# Patient Record
Sex: MALE | Race: WHITE | NOT HISPANIC OR LATINO | Employment: FULL TIME | RURAL
[De-identification: names, ages, dates, MRNs, and addresses within clinical notes are randomized per-mention and may not be internally consistent; named-entity substitution may affect disease eponyms.]

---
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
## 2021-06-04 ENCOUNTER — IMMUNIZATION (OUTPATIENT)
Dept: FAMILY MEDICINE | Facility: CLINIC | Age: 21
End: 2021-06-04
Payer: OTHER GOVERNMENT

## 2021-06-04 DIAGNOSIS — Z23 NEED FOR VACCINATION: Primary | ICD-10-CM

## 2021-06-04 PROCEDURE — 91301 COVID-19, MRNA, LNP-S, PF, 100 MCG/0.5 ML DOSE VACCINE: ICD-10-PCS | Mod: ,,, | Performed by: FAMILY MEDICINE

## 2021-06-04 PROCEDURE — 0011A COVID-19, MRNA, LNP-S, PF, 100 MCG/0.5 ML DOSE VACCINE: ICD-10-PCS | Mod: ,,, | Performed by: FAMILY MEDICINE

## 2021-06-04 PROCEDURE — 91301 COVID-19, MRNA, LNP-S, PF, 100 MCG/0.5 ML DOSE VACCINE: CPT | Mod: ,,, | Performed by: FAMILY MEDICINE

## 2021-06-04 PROCEDURE — 0011A COVID-19, MRNA, LNP-S, PF, 100 MCG/0.5 ML DOSE VACCINE: CPT | Mod: ,,, | Performed by: FAMILY MEDICINE

## 2021-07-16 ENCOUNTER — IMMUNIZATION (OUTPATIENT)
Dept: FAMILY MEDICINE | Facility: CLINIC | Age: 21
End: 2021-07-16
Payer: OTHER GOVERNMENT

## 2021-07-16 DIAGNOSIS — Z23 NEED FOR VACCINATION: Primary | ICD-10-CM

## 2021-07-16 PROCEDURE — 91301 COVID-19, MRNA, LNP-S, PF, 100 MCG/0.5 ML DOSE VACCINE: CPT | Mod: ,,, | Performed by: FAMILY MEDICINE

## 2021-07-16 PROCEDURE — 91301 COVID-19, MRNA, LNP-S, PF, 100 MCG/0.5 ML DOSE VACCINE: ICD-10-PCS | Mod: ,,, | Performed by: FAMILY MEDICINE

## 2021-07-16 PROCEDURE — 0012A COVID-19, MRNA, LNP-S, PF, 100 MCG/0.5 ML DOSE VACCINE: CPT | Mod: ,,, | Performed by: FAMILY MEDICINE

## 2021-07-16 PROCEDURE — 0012A COVID-19, MRNA, LNP-S, PF, 100 MCG/0.5 ML DOSE VACCINE: ICD-10-PCS | Mod: ,,, | Performed by: FAMILY MEDICINE

## 2022-07-26 ENCOUNTER — OFFICE VISIT (OUTPATIENT)
Dept: FAMILY MEDICINE | Facility: CLINIC | Age: 22
End: 2022-07-26
Payer: OTHER GOVERNMENT

## 2022-07-26 VITALS
HEART RATE: 64 BPM | OXYGEN SATURATION: 98 % | TEMPERATURE: 99 F | DIASTOLIC BLOOD PRESSURE: 84 MMHG | RESPIRATION RATE: 16 BRPM | HEIGHT: 69 IN | BODY MASS INDEX: 29.59 KG/M2 | WEIGHT: 199.81 LBS | SYSTOLIC BLOOD PRESSURE: 122 MMHG

## 2022-07-26 DIAGNOSIS — E66.3 OVERWEIGHT (BMI 25.0-29.9): Primary | ICD-10-CM

## 2022-07-26 DIAGNOSIS — Z79.899 HIGH RISK MEDICATION USE: ICD-10-CM

## 2022-07-26 DIAGNOSIS — E66.9 OBESITY, UNSPECIFIED CLASSIFICATION, UNSPECIFIED OBESITY TYPE, UNSPECIFIED WHETHER SERIOUS COMORBIDITY PRESENT: ICD-10-CM

## 2022-07-26 LAB

## 2022-07-26 PROCEDURE — 80305 DRUG TEST PRSMV DIR OPT OBS: CPT | Mod: QW,,, | Performed by: NURSE PRACTITIONER

## 2022-07-26 PROCEDURE — 99214 OFFICE O/P EST MOD 30 MIN: CPT | Mod: ,,, | Performed by: NURSE PRACTITIONER

## 2022-07-26 PROCEDURE — 80305 POCT URINE DRUG SCREEN PRESUMP: ICD-10-PCS | Mod: QW,,, | Performed by: NURSE PRACTITIONER

## 2022-07-26 PROCEDURE — 99214 PR OFFICE/OUTPT VISIT, EST, LEVL IV, 30-39 MIN: ICD-10-PCS | Mod: ,,, | Performed by: NURSE PRACTITIONER

## 2022-07-26 RX ORDER — PHENTERMINE HYDROCHLORIDE 37.5 MG/1
37.5 TABLET ORAL
Qty: 30 TABLET | Refills: 0 | Status: SHIPPED | OUTPATIENT
Start: 2022-07-26 | End: 2022-08-25

## 2022-07-26 NOTE — PROGRESS NOTES
Donna Leyva NP   [facility]  [address]  [place]     PATIENT NAME: Antonio Mejia  : 2000  DATE: 22  MRN: [id number]      Billing Provider: Donna Leyva NP  Level of Service:   Patient PCP Information     Provider PCP Type    Donna Leyva NP General          Reason for Visit / Chief Complaint: ADHD (Has been on Adderall before. However, wants to discuss rx for Adipex r/t appetite suppressant. )       Update PCP  Update Chief Complaint         History of Present Illness / Problem Focused Workflow     Antonio Mejia presents to the clinic pt does not want to take any adderall due to being in the , however, he stated that he needs to use a few pounds and would like to try some adipex. Has been exercising and dieting, and lost wt but feels he has platoed  I discussed with him the adverse effects of adipex such increased risks for strokes, MIs and malfunction of heart valves. He stated that he was aware of it and wanted to try for just 1 month    Review of Systems     Review of Systems   Constitutional: Negative for chills, fatigue and fever.   HENT: Negative for nasal congestion, ear pain, facial swelling, hearing loss, mouth dryness, mouth sores, postnasal drip, rhinorrhea, sinus pressure/congestion and goiter.    Eyes: Negative for discharge and itching.   Respiratory: Negative for cough, shortness of breath and wheezing.    Cardiovascular: Negative for chest pain and leg swelling.   Gastrointestinal: Negative for abdominal pain, change in bowel habit and change in bowel habit.   Genitourinary: Negative for difficulty urinating, dysuria, enuresis, frequency, hematuria and urgency.   Neurological: Negative for dizziness, vertigo, syncope, weakness and headaches.   Psychiatric/Behavioral: Negative for decreased concentration.        Medical / Social / Family History     Past Medical History:   Diagnosis Date    ADHD (attention deficit hyperactivity disorder)   "      History reviewed. No pertinent surgical history.    Social History    reports that he has quit smoking. He has never used smokeless tobacco. He reports previous alcohol use.    Family History  's family history includes Dementia in his paternal grandmother.    Medications and Allergies     Medications  No outpatient medications have been marked as taking for the 7/26/22 encounter (Office Visit) with Donna Leyva NP.       Allergies  Review of patient's allergies indicates:  No Known Allergies    Physical Examination     Vitals:    07/26/22 1418   BP: 122/84   BP Location: Right arm   Patient Position: Sitting   Pulse: 64   Resp: 16   Temp: 98.7 °F (37.1 °C)   TempSrc: Oral   SpO2: 98%   Weight: 90.6 kg (199 lb 12.8 oz)   Height: 5' 9" (1.753 m)      Physical Exam  Vitals and nursing note reviewed.   Constitutional:       Appearance: Normal appearance.      Comments: pts bmi is 9.51, just borderline for obesity   HENT:      Head: Normocephalic.      Right Ear: Tympanic membrane, ear canal and external ear normal.      Left Ear: Tympanic membrane, ear canal and external ear normal.      Nose: Nose normal.      Mouth/Throat:      Mouth: Mucous membranes are moist.      Pharynx: Oropharynx is clear.   Eyes:      Extraocular Movements: Extraocular movements intact.      Conjunctiva/sclera: Conjunctivae normal.      Pupils: Pupils are equal, round, and reactive to light.   Cardiovascular:      Rate and Rhythm: Normal rate and regular rhythm.      Pulses: Normal pulses.      Heart sounds: Normal heart sounds.   Pulmonary:      Effort: Pulmonary effort is normal.      Breath sounds: Normal breath sounds.   Abdominal:      General: Bowel sounds are normal.      Palpations: Abdomen is soft.   Musculoskeletal:         General: Normal range of motion.   Skin:     General: Skin is warm and dry.      Capillary Refill: Capillary refill takes less than 2 seconds.   Neurological:      General: No focal deficit " present.      Mental Status: He is alert and oriented to person, place, and time.   Psychiatric:         Mood and Affect: Mood normal.         Behavior: Behavior normal.          Assessment and Plan (including Health Maintenance)      Problem List  Smart Sets  Document Outside HM   :    Plan: cont to exercise, low carb diet, meds as ordered, return to clinic as needed. Stop taking meds any chest pain or sob   and uds reviewed, both negative    Attention deficit hyperactivity disorder (ADHD), unspecified ADHD type  -     POCT Urine Drug Screen Presump            Health Maintenance Due   Topic Date Due    Hepatitis C Screening  Never done    Lipid Panel  Never done    HPV Vaccines (1 - Male 2-dose series) Never done    HIV Screening  Never done    TETANUS VACCINE  Never done    COVID-19 Vaccine (3 - Booster for Moderna series) 12/16/2021       Problem List Items Addressed This Visit    None     Visit Diagnoses     Attention deficit hyperactivity disorder (ADHD), unspecified ADHD type    -  Primary    Relevant Orders    POCT Urine Drug Screen Presump (Completed)            Health Maintenance Topics with due status: Not Due       Topic Last Completion Date    Influenza Vaccine Not Due       Procedures     No future appointments.     No follow-ups on file.       Signature:  Donna Leyva NP    Date of encounter: 7/26/22

## 2023-12-21 ENCOUNTER — ATHLETIC TRAINING SESSION (OUTPATIENT)
Dept: SPORTS MEDICINE | Facility: CLINIC | Age: 23
End: 2023-12-21

## 2023-12-22 NOTE — PROGRESS NOTES
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    HPI    ROS              Objective:       General: Antonio is well-developed, well-nourished, appears stated age, in no acute distress, alert and oriented to time, place and person.     AT Session  Has been to a doctor in [place] and wants to see an orthopedic      Assessment:     Status:     Date Seen:     Date of Injury:     Date Out:     Date Cleared:       Plan:       1. Ice, rest when he can, ibuprofen for pain  2. Physician Referral: yes, Dr. Walter Ventura  3. ED Referral: no  4. Parent/Guardian Notified: No  5. All questions were answered, ath. will contact me for questions or concerns in  the interim.  6.         Eligible to use School Insurance:

## 2024-01-31 DIAGNOSIS — M25.561 RIGHT KNEE PAIN, UNSPECIFIED CHRONICITY: Primary | ICD-10-CM

## 2024-02-05 ENCOUNTER — OFFICE VISIT (OUTPATIENT)
Dept: FAMILY MEDICINE | Facility: CLINIC | Age: 24
End: 2024-02-05
Payer: OTHER GOVERNMENT

## 2024-02-05 VITALS
RESPIRATION RATE: 19 BRPM | TEMPERATURE: 99 F | BODY MASS INDEX: 32.16 KG/M2 | DIASTOLIC BLOOD PRESSURE: 85 MMHG | SYSTOLIC BLOOD PRESSURE: 128 MMHG | OXYGEN SATURATION: 98 % | HEART RATE: 80 BPM | WEIGHT: 217.81 LBS

## 2024-02-05 DIAGNOSIS — J01.00 ACUTE NON-RECURRENT MAXILLARY SINUSITIS: Primary | ICD-10-CM

## 2024-02-05 PROCEDURE — 99213 OFFICE O/P EST LOW 20 MIN: CPT | Mod: ,,, | Performed by: NURSE PRACTITIONER

## 2024-02-05 RX ORDER — FLUTICASONE PROPIONATE 50 MCG
2 SPRAY, SUSPENSION (ML) NASAL DAILY
Qty: 16 G | Refills: 0 | Status: SHIPPED | OUTPATIENT
Start: 2024-02-05

## 2024-02-05 RX ORDER — AZITHROMYCIN 250 MG/1
TABLET, FILM COATED ORAL
Qty: 6 TABLET | Refills: 0 | Status: SHIPPED | OUTPATIENT
Start: 2024-02-05 | End: 2024-02-10

## 2024-02-05 NOTE — ASSESSMENT & PLAN NOTE
Zpak prescribed to take as directed. Instructed to take all abx until gone even if start to feel better.    Flonase prescribed to take as needed for nasal congestion.   Declines injection today.   Pt states he is just going to give his wife his med when he starts to feeling better. Informed he really needs to take his full rx of abx but I will also send in zpak for wife as well as she has had a recent visit at this clinic. Pt vu.   Instructed to RTC for any new/worsening/persisting ssx.

## 2024-02-05 NOTE — PROGRESS NOTES
MARLON Maria   [facility]  [address]  [place] [address]     PATIENT NAME: Antonio Mejia  : 2000  DATE: 24  MRN: [id number]      Billing Provider: MARLON Maria  Level of Service:   Patient PCP Information       Provider PCP Type    MARLON Maria General            Reason for Visit / Chief Complaint: Cough (Pt is c/o of a sore throat,cough and sinus pressure x 3 days. States his wife also has the same symptoms as well. Swabs denied.) and Sore Throat   Health Maintenance Due   Topic Date Due    Hepatitis C Screening  Never done    Lipid Panel  Never done    HPV Vaccines (1 - Male 2-dose series) Never done    HIV Screening  Never done    TETANUS VACCINE  Never done    COVID-19 Vaccine (3 - 2023-24 season) 2023          History of Present Illness / Problem Focused Workflow     Antonio Mejia presents to the clinic with headache, cough, sore throat, nasal congestion, sinus pressure. He denies swabs today. He states cough is productive with green sputum. He states wife is also having same symptoms and would like something called in for her as well. He denies fever, earaches, abd pain, n/v/d, He does not have a significant PMH and does not take any routine medications. He denies any other need at this time. NAD noted.       Wt Readings from Last 3 Encounters:   24 1450 98.8 kg (217 lb 12.8 oz)   22 1418 90.6 kg (199 lb 12.8 oz)        BP Readings from Last 3 Encounters:   24 128/85   22 122/84        Review of Systems     Review of Systems   Constitutional: Negative.    HENT:  Positive for nasal congestion, postnasal drip, sinus pressure/congestion and sore throat. Negative for ear discharge and ear pain.    Eyes: Negative.    Respiratory:  Positive for cough.    Cardiovascular: Negative.    Gastrointestinal: Negative.    Endocrine: Negative.    Genitourinary: Negative.    Musculoskeletal: Negative.    Integumentary:  Negative.    Allergic/Immunologic: Negative.    Neurological:  Positive for headaches.   Hematological: Negative.    Psychiatric/Behavioral: Negative.          Medical / Social / Family History     Past Medical History:   Diagnosis Date    ADHD (attention deficit hyperactivity disorder)        History reviewed. No pertinent surgical history.    Social History    reports that he has quit smoking. He has never used smokeless tobacco. He reports that he does not currently use alcohol.    Family History  's family history includes Dementia in his paternal grandmother.    Medications and Allergies     Medications  No outpatient medications have been marked as taking for the 2/5/24 encounter (Office Visit) with Allyson Jesus FNP.       Allergies  Review of patient's allergies indicates:  No Known Allergies    Physical Examination     Vitals:    02/05/24 1450   BP: 128/85   BP Location: Left arm   Patient Position: Sitting   BP Method: Large (Automatic)   Pulse: 80   Resp: 19   Temp: 99.4 °F (37.4 °C)   TempSrc: Oral   SpO2: 98%   Weight: 98.8 kg (217 lb 12.8 oz)      Physical Exam  Constitutional:       Appearance: Normal appearance.   HENT:      Head: Normocephalic.      Right Ear: Hearing, ear canal and external ear normal. A middle ear effusion is present.      Left Ear: Hearing, ear canal and external ear normal. A middle ear effusion is present.      Nose: Congestion present.      Mouth/Throat:      Mouth: Mucous membranes are moist.      Comments: PND noted  Eyes:      Extraocular Movements: Extraocular movements intact.   Cardiovascular:      Rate and Rhythm: Normal rate and regular rhythm.      Pulses: Normal pulses.      Heart sounds: Normal heart sounds.   Pulmonary:      Effort: Pulmonary effort is normal.      Breath sounds: Normal breath sounds.   Skin:     General: Skin is warm and dry.      Capillary Refill: Capillary refill takes less than 2 seconds.   Neurological:      General: No focal deficit present.       Mental Status: He is alert and oriented to person, place, and time.   Psychiatric:         Mood and Affect: Mood normal.         Behavior: Behavior normal.          Assessment and Plan (including Health Maintenance)   :    Plan:     There are no Patient Instructions on file for this visit.       Health Maintenance Due   Topic Date Due    Hepatitis C Screening  Never done    Lipid Panel  Never done    HPV Vaccines (1 - Male 2-dose series) Never done    HIV Screening  Never done    TETANUS VACCINE  Never done    COVID-19 Vaccine (3 - 2023-24 season) 09/01/2023       Problem List Items Addressed This Visit       Acute non-recurrent maxillary sinusitis - Primary    Current Assessment & Plan     Zpak prescribed to take as directed. Instructed to take all abx until gone even if start to feel better.    Flonase prescribed to take as needed for nasal congestion.   Declines injection today.   Pt states he is just going to give his wife his med when he starts to feeling better. Informed he really needs to take his full rx of abx but I will also send in zpak for wife as well as she has had a recent visit at this clinic. Pt vu.   Instructed to RTC for any new/worsening/persisting ssx.           Relevant Medications    fluticasone propionate (FLONASE) 50 mcg/actuation nasal spray    azithromycin (Z-PETRA) 250 MG tablet     Acute non-recurrent maxillary sinusitis  -     fluticasone propionate (FLONASE) 50 mcg/actuation nasal spray; 2 sprays (100 mcg total) by Each Nostril route once daily.  Dispense: 16 g; Refill: 0  -     azithromycin (Z-PETRA) 250 MG tablet; Take 2 tablets by mouth on day 1; Take 1 tablet by mouth on days 2-5  Dispense: 6 tablet; Refill: 0       The patient has no Health Maintenance topics of status Not Due      Future Appointments   Date Time Provider Department Center   2/6/2024 10:30 AM Walter Ventura MD [facility]   2/6/2024 10:30 AM [facility] XR[facility]        Follow up if  symptoms worsen or fail to improve.    Health Maintenance Due   Topic Date Due    Hepatitis C Screening  Never done    Lipid Panel  Never done    HPV Vaccines (1 - Male 2-dose series) Never done    HIV Screening  Never done    TETANUS VACCINE  Never done    COVID-19 Vaccine (3 - 2023-24 season) 09/01/2023        Signature:  MARLON Maria    Date of encounter: 2/5/24

## 2024-02-06 ENCOUNTER — HOSPITAL ENCOUNTER (OUTPATIENT)
Dept: RADIOLOGY | Facility: HOSPITAL | Age: 24
Discharge: HOME | End: 2024-02-06
Attending: ORTHOPAEDIC SURGERY
Payer: OTHER GOVERNMENT

## 2024-02-06 ENCOUNTER — OFFICE VISIT (OUTPATIENT)
Dept: ORTHOPEDICS | Facility: CLINIC | Age: 24
End: 2024-02-06
Payer: OTHER GOVERNMENT

## 2024-02-06 VITALS — BODY MASS INDEX: 31.1 KG/M2 | WEIGHT: 210 LBS | HEIGHT: 69 IN

## 2024-02-06 DIAGNOSIS — S83.211A BUCKET-HANDLE TEAR OF MEDIAL MENISCUS OF RIGHT KNEE AS CURRENT INJURY, INITIAL ENCOUNTER: Primary | ICD-10-CM

## 2024-02-06 DIAGNOSIS — M25.561 RIGHT KNEE PAIN, UNSPECIFIED CHRONICITY: ICD-10-CM

## 2024-02-06 PROCEDURE — 73564 X-RAY EXAM KNEE 4 OR MORE: CPT | Mod: TC,RT

## 2024-02-06 PROCEDURE — 99204 OFFICE O/P NEW MOD 45 MIN: CPT | Mod: S$PBB,,, | Performed by: ORTHOPAEDIC SURGERY

## 2024-02-06 PROCEDURE — 73564 X-RAY EXAM KNEE 4 OR MORE: CPT | Mod: 26,RT,, | Performed by: ORTHOPAEDIC SURGERY

## 2024-02-06 PROCEDURE — 99213 OFFICE O/P EST LOW 20 MIN: CPT | Mod: PBBFAC,25 | Performed by: ORTHOPAEDIC SURGERY

## 2024-02-06 NOTE — H&P (VIEW-ONLY)
ASSESSMENT:      ICD-10-CM ICD-9-CM   1. Bucket-handle tear of medial meniscus of right knee as current injury, initial encounter  S83.211A 836.0       PLAN:     Findings and treatment options were discussed with the patient regarding the diagnosis.   All questions were answered regarding Antonio Mejia 's painful knee.      The conservative options including NSAIDs, activity modification, physical therapy, corticosteroid injection, and viscosupplimentation/ Platelet rich plasma injections were discussed. he is interested in surgical intervention at this time, as conservative measures up to this point have not fully relieved or resolved his symptoms.    The surgical process of knee arthroscopy was discussed in detail with the patient including a detailed discussion of the procedure itself (including visual model, x-ray review, and literature review). The typical perioperative and post-operative course was discussed and perioperative risks were discussed to the patient's satisfaction.  Risks and complications discussed included but were not limited to the risks of anesthetic complications, infection, bleeding, wound healing complications, instability, limb length inequality, neurologic dysfunction including numbness,  DVT, pulmonary embolism, perioperative medical risks (cardiac, pulmonary, renal, neurologic), and death and the patient elects to proceed. We will initiate pre-operative medical evaluation and set a provisional date for surgical intervention according to the patient's schedule. 25 minutes was spent in direct consultation with the patient counselling him on the items listed above.    Will plan for Rt knee medial meniscus repair    There are no Patient Instructions on file for this visit.    IMAGING:   X-Ray Knee Complete 4 Or More Views Right    Result Date: 2/6/2024  See Procedure Notes for results. IMPRESSION: Please see Ortho procedure notes for report.  This procedure was auto-finalized  by: Virtual Radiologist   Four views right knee were obtained today demonstrating well-preserved mediolateral joint spaces no signs of fracture dislocation or pathologic bone    I did review a MRI which was obtained at an outside facility demonstrate the presence of a displaced flipped bucket-handle medial meniscus tear.      CC: Knee pain    24 y.o. Male who presents as a new patient to me for evaluation of  right knee pain.  States he has been having pain for several months. His knee gave way back in October while he was on duty.  Occupation:[employer]  Pain has been present for several months.  Injury description slipped on ice.   Mechanical symptoms, such as clicking, locking, and popping are present.    Swelling and effusions  are not present.   The patient does not  describe symptoms of knee instability, such as the knee buckling and the knee giving way.   Symptoms are worsened with activity.  Better with rest. Treatment thus far has included rest, activity modifications, and oral medications.    he  has not had formal physical therapy.  he has not had prior injections injections into the knee.   he has had previous advanced imaging such as MRI.     Here today to discuss diagnosis and treatment options.          REVIEW OF SYSTEMS:   Constitution: Negative. Negative for chills, fever and night sweats.    Hematologic/Lymphatic: Negative for bleeding problem. Does not bruise/bleed easily.   Skin: Negative for dry skin, itching and rash.   Musculoskeletal: Negative for falls. Positive for knee pain and muscle weakness.     All other review of symptoms were reviewed and found to be noncontributory.     PAST MEDICAL HISTORY:   Past Medical History:   Diagnosis Date    ADHD (attention deficit hyperactivity disorder)        PAST SURGICAL HISTORY:   Past Surgical History:   Procedure Laterality Date    HAND SURGERY      TONSILLECTOMY         FAMILY HISTORY:   Family History   Problem Relation Age of Onset    Dementia  Paternal Grandmother        SOCIAL HISTORY:   Social History     Socioeconomic History    Marital status:    Tobacco Use    Smoking status: Former    Smokeless tobacco: Never   Substance and Sexual Activity    Alcohol use: Not Currently       MEDICATIONS:     Current Outpatient Medications:     azithromycin (Z-PETRA) 250 MG tablet, Take 2 tablets by mouth on day 1; Take 1 tablet by mouth on days 2-5, Disp: 6 tablet, Rfl: 0    fluticasone propionate (FLONASE) 50 mcg/actuation nasal spray, 2 sprays (100 mcg total) by Each Nostril route once daily., Disp: 16 g, Rfl: 0    ALLERGIES:   Review of patient's allergies indicates:  No Known Allergies     PHYSICAL EXAMINATION:    General    Constitutional: He is oriented to person, place, and time. He appears well-developed and well-nourished.   HENT:   Head: Normocephalic and atraumatic.   Nose: Nose normal.   Eyes: EOM are normal. Pupils are equal, round, and reactive to light.   Cardiovascular:  Normal rate and intact distal pulses.            Pulmonary/Chest: Effort normal. No respiratory distress. He exhibits no tenderness.   Abdominal: Soft. He exhibits no distension. There is no abdominal tenderness.   Neurological: He is alert and oriented to person, place, and time. He has normal reflexes.   Psychiatric: He has a normal mood and affect. His behavior is normal. Judgment and thought content normal.           Right Knee Exam     Inspection   Swelling: present  Deformity: absent    Tenderness   The patient is tender to palpation of the medial retinaculum and medial joint line.    Crepitus   The patient has crepitus of the medial joint line and medial plica.    Range of Motion   Flexion:  abnormal     Tests   Meniscus   Varun:  Medial - positive   Ligament Examination   Lachman: normal (-1 to 2mm)   MCL - Valgus: normal (0 to 2mm)  LCL - Varus: normal  Dial Test at 30 degrees: normal (< 5 degrees)  Posterolateral Corner: unstable (>15 degrees  difference)  Patella   Patellar apprehension: negative  Patellar Grind: positive    Other   Sensation: normal    Comments:  Pain with Thessaly Maneuver    Left Knee Exam   Left knee exam is normal.    Muscle Strength   Right Lower Extremity   Quadriceps:  5/5   Hamstrin/5     Reflexes     Right Side   Achilles:  2+  Quadriceps:  2+    Vascular Exam     Right Pulses  Dorsalis Pedis:      2+  Posterior Tibial:      2+        No orders of the defined types were placed in this encounter.      Procedures

## 2024-02-09 DIAGNOSIS — S83.211A BUCKET-HANDLE TEAR OF MEDIAL MENISCUS OF RIGHT KNEE AS CURRENT INJURY, INITIAL ENCOUNTER: Primary | ICD-10-CM

## 2024-02-09 DIAGNOSIS — S83.211A: ICD-10-CM

## 2024-02-09 RX ORDER — MUPIROCIN 20 MG/G
OINTMENT TOPICAL
Status: CANCELLED | OUTPATIENT
Start: 2024-02-09

## 2024-02-09 RX ORDER — CEFAZOLIN SODIUM 2 G/50ML
2 SOLUTION INTRAVENOUS
Status: CANCELLED | OUTPATIENT
Start: 2024-02-09

## 2024-02-09 RX ORDER — SODIUM CHLORIDE 9 MG/ML
INJECTION, SOLUTION INTRAVENOUS CONTINUOUS
Status: CANCELLED | OUTPATIENT
Start: 2024-02-09

## 2024-02-13 ENCOUNTER — TELEPHONE (OUTPATIENT)
Dept: ORTHOPEDICS | Facility: CLINIC | Age: 24
End: 2024-02-13
Payer: OTHER GOVERNMENT

## 2024-02-16 ENCOUNTER — TELEPHONE (OUTPATIENT)
Dept: ORTHOPEDICS | Facility: CLINIC | Age: 24
End: 2024-02-16
Payer: OTHER GOVERNMENT

## 2024-02-19 ENCOUNTER — HOSPITAL ENCOUNTER (OUTPATIENT)
Facility: HOSPITAL | Age: 24
Discharge: HOME | End: 2024-02-19
Attending: ORTHOPAEDIC SURGERY | Admitting: ORTHOPAEDIC SURGERY
Payer: OTHER GOVERNMENT

## 2024-02-19 ENCOUNTER — ANESTHESIA EVENT (OUTPATIENT)
Dept: SURGERY | Facility: HOSPITAL | Age: 24
End: 2024-02-19
Payer: OTHER GOVERNMENT

## 2024-02-19 ENCOUNTER — ANESTHESIA (OUTPATIENT)
Dept: SURGERY | Facility: HOSPITAL | Age: 24
End: 2024-02-19
Payer: OTHER GOVERNMENT

## 2024-02-19 VITALS
BODY MASS INDEX: 30.06 KG/M2 | RESPIRATION RATE: 16 BRPM | HEIGHT: 70 IN | DIASTOLIC BLOOD PRESSURE: 52 MMHG | WEIGHT: 210 LBS | SYSTOLIC BLOOD PRESSURE: 130 MMHG | HEART RATE: 80 BPM | OXYGEN SATURATION: 98 % | TEMPERATURE: 98 F

## 2024-02-19 DIAGNOSIS — S83.211A BUCKET-HANDLE TEAR OF MEDIAL MENISCUS OF RIGHT KNEE AS CURRENT INJURY, INITIAL ENCOUNTER: ICD-10-CM

## 2024-02-19 DIAGNOSIS — S83.211A: Primary | ICD-10-CM

## 2024-02-19 PROCEDURE — 37000009 HC ANESTHESIA EA ADD 15 MINS: Performed by: ORTHOPAEDIC SURGERY

## 2024-02-19 PROCEDURE — 37000008 HC ANESTHESIA 1ST 15 MINUTES: Performed by: ORTHOPAEDIC SURGERY

## 2024-02-19 PROCEDURE — 36000711: Performed by: ORTHOPAEDIC SURGERY

## 2024-02-19 PROCEDURE — D9220A PRA ANESTHESIA: Mod: ANES,,, | Performed by: ANESTHESIOLOGY

## 2024-02-19 PROCEDURE — 63600175 PHARM REV CODE 636 W HCPCS

## 2024-02-19 PROCEDURE — 36000710: Performed by: ORTHOPAEDIC SURGERY

## 2024-02-19 PROCEDURE — 27201423 OPTIME MED/SURG SUP & DEVICES STERILE SUPPLY: Performed by: ORTHOPAEDIC SURGERY

## 2024-02-19 PROCEDURE — 25000003 PHARM REV CODE 250: Performed by: ANESTHESIOLOGY

## 2024-02-19 PROCEDURE — 25000003 PHARM REV CODE 250: Performed by: ORTHOPAEDIC SURGERY

## 2024-02-19 PROCEDURE — 71000015 HC POSTOP RECOV 1ST HR: Performed by: ORTHOPAEDIC SURGERY

## 2024-02-19 PROCEDURE — 01400 ANES OPN/ARTHRS KNEE JT NOS: CPT | Performed by: ORTHOPAEDIC SURGERY

## 2024-02-19 PROCEDURE — 29882 ARTHRS KNE SRG MNISC RPR M/L: CPT | Mod: 22,RT,, | Performed by: ORTHOPAEDIC SURGERY

## 2024-02-19 PROCEDURE — 63600175 PHARM REV CODE 636 W HCPCS: Performed by: ANESTHESIOLOGY

## 2024-02-19 PROCEDURE — 71000033 HC RECOVERY, INTIAL HOUR: Performed by: ORTHOPAEDIC SURGERY

## 2024-02-19 PROCEDURE — 25000003 PHARM REV CODE 250

## 2024-02-19 PROCEDURE — D9220A PRA ANESTHESIA: Mod: CRNA,,,

## 2024-02-19 PROCEDURE — C1713 ANCHOR/SCREW BN/BN,TIS/BN: HCPCS | Performed by: ORTHOPAEDIC SURGERY

## 2024-02-19 PROCEDURE — 97161 PT EVAL LOW COMPLEX 20 MIN: CPT

## 2024-02-19 PROCEDURE — 71000016 HC POSTOP RECOV ADDL HR: Performed by: ORTHOPAEDIC SURGERY

## 2024-02-19 PROCEDURE — 63600175 PHARM REV CODE 636 W HCPCS: Mod: JZ,JG | Performed by: ORTHOPAEDIC SURGERY

## 2024-02-19 DEVICE — FAST-FIX 360 CURVED MENISCAL                                    REPAIR SYSTEM
Type: IMPLANTABLE DEVICE | Site: KNEE | Status: FUNCTIONAL
Brand: FAST-FIX

## 2024-02-19 DEVICE — SYS FASTFIX REV CURVED NDL 360: Type: IMPLANTABLE DEVICE | Site: KNEE | Status: FUNCTIONAL

## 2024-02-19 RX ORDER — ONDANSETRON 4 MG/1
8 TABLET, ORALLY DISINTEGRATING ORAL EVERY 8 HOURS PRN
Status: DISCONTINUED | OUTPATIENT
Start: 2024-02-19 | End: 2024-02-19 | Stop reason: HOSPADM

## 2024-02-19 RX ORDER — PROPOFOL 10 MG/ML
VIAL (ML) INTRAVENOUS
Status: DISCONTINUED | OUTPATIENT
Start: 2024-02-19 | End: 2024-02-19

## 2024-02-19 RX ORDER — DIMENHYDRINATE 50 MG
50 TABLET ORAL ONCE
Status: COMPLETED | OUTPATIENT
Start: 2024-02-19 | End: 2024-02-19

## 2024-02-19 RX ORDER — SODIUM CHLORIDE 9 MG/ML
INJECTION, SOLUTION INTRAVENOUS CONTINUOUS
Status: DISCONTINUED | OUTPATIENT
Start: 2024-02-19 | End: 2024-02-19 | Stop reason: HOSPADM

## 2024-02-19 RX ORDER — DEXAMETHASONE SODIUM PHOSPHATE 4 MG/ML
INJECTION, SOLUTION INTRA-ARTICULAR; INTRALESIONAL; INTRAMUSCULAR; INTRAVENOUS; SOFT TISSUE
Status: DISCONTINUED | OUTPATIENT
Start: 2024-02-19 | End: 2024-02-19

## 2024-02-19 RX ORDER — CELECOXIB 200 MG/1
200 CAPSULE ORAL 2 TIMES DAILY
Qty: 60 CAPSULE | Refills: 2 | Status: SHIPPED | OUTPATIENT
Start: 2024-02-19

## 2024-02-19 RX ORDER — ONDANSETRON 4 MG/1
4 TABLET, FILM COATED ORAL ONCE
Status: DISCONTINUED | OUTPATIENT
Start: 2024-02-19 | End: 2024-02-19

## 2024-02-19 RX ORDER — LIDOCAINE HYDROCHLORIDE 20 MG/ML
INJECTION, SOLUTION EPIDURAL; INFILTRATION; INTRACAUDAL; PERINEURAL
Status: DISCONTINUED | OUTPATIENT
Start: 2024-02-19 | End: 2024-02-19

## 2024-02-19 RX ORDER — DIPHENHYDRAMINE HYDROCHLORIDE 50 MG/ML
25 INJECTION INTRAMUSCULAR; INTRAVENOUS EVERY 6 HOURS PRN
Status: DISCONTINUED | OUTPATIENT
Start: 2024-02-19 | End: 2024-02-19 | Stop reason: HOSPADM

## 2024-02-19 RX ORDER — OXYCODONE AND ACETAMINOPHEN 10; 325 MG/1; MG/1
1 TABLET ORAL EVERY 6 HOURS PRN
Qty: 30 TABLET | Refills: 0 | Status: SHIPPED | OUTPATIENT
Start: 2024-02-19 | End: 2024-02-28 | Stop reason: SDUPTHER

## 2024-02-19 RX ORDER — HYDROMORPHONE HYDROCHLORIDE 2 MG/ML
0.5 INJECTION, SOLUTION INTRAMUSCULAR; INTRAVENOUS; SUBCUTANEOUS EVERY 5 MIN PRN
Status: DISCONTINUED | OUTPATIENT
Start: 2024-02-19 | End: 2024-02-19 | Stop reason: HOSPADM

## 2024-02-19 RX ORDER — ONDANSETRON 4 MG/1
4 TABLET, FILM COATED ORAL ONCE
Status: COMPLETED | OUTPATIENT
Start: 2024-02-19 | End: 2024-02-19

## 2024-02-19 RX ORDER — MEPERIDINE HYDROCHLORIDE 25 MG/ML
25 INJECTION INTRAMUSCULAR; INTRAVENOUS; SUBCUTANEOUS EVERY 10 MIN PRN
Status: DISCONTINUED | OUTPATIENT
Start: 2024-02-19 | End: 2024-02-19 | Stop reason: HOSPADM

## 2024-02-19 RX ORDER — DIMENHYDRINATE 50 MG
50 TABLET ORAL ONCE
Status: DISCONTINUED | OUTPATIENT
Start: 2024-02-19 | End: 2024-02-19

## 2024-02-19 RX ORDER — OXYCODONE HYDROCHLORIDE 5 MG/1
10 TABLET ORAL EVERY 4 HOURS PRN
Status: DISCONTINUED | OUTPATIENT
Start: 2024-02-19 | End: 2024-02-19 | Stop reason: HOSPADM

## 2024-02-19 RX ORDER — MUPIROCIN 20 MG/G
OINTMENT TOPICAL
Status: DISCONTINUED | OUTPATIENT
Start: 2024-02-19 | End: 2024-02-19 | Stop reason: HOSPADM

## 2024-02-19 RX ORDER — MUPIROCIN 20 MG/G
OINTMENT TOPICAL 2 TIMES DAILY
Status: DISCONTINUED | OUTPATIENT
Start: 2024-02-19 | End: 2024-02-19 | Stop reason: HOSPADM

## 2024-02-19 RX ORDER — OXYCODONE AND ACETAMINOPHEN 5; 325 MG/1; MG/1
1 TABLET ORAL EVERY 4 HOURS PRN
Status: DISCONTINUED | OUTPATIENT
Start: 2024-02-19 | End: 2024-02-19 | Stop reason: HOSPADM

## 2024-02-19 RX ORDER — FENTANYL CITRATE 50 UG/ML
INJECTION, SOLUTION INTRAMUSCULAR; INTRAVENOUS
Status: DISCONTINUED | OUTPATIENT
Start: 2024-02-19 | End: 2024-02-19

## 2024-02-19 RX ORDER — CEFAZOLIN SODIUM 1 G/3ML
INJECTION, POWDER, FOR SOLUTION INTRAMUSCULAR; INTRAVENOUS
Status: DISCONTINUED | OUTPATIENT
Start: 2024-02-19 | End: 2024-02-19

## 2024-02-19 RX ORDER — ONDANSETRON HYDROCHLORIDE 2 MG/ML
4 INJECTION, SOLUTION INTRAVENOUS DAILY PRN
Status: DISCONTINUED | OUTPATIENT
Start: 2024-02-19 | End: 2024-02-19 | Stop reason: HOSPADM

## 2024-02-19 RX ORDER — MORPHINE SULFATE 10 MG/ML
4 INJECTION INTRAMUSCULAR; INTRAVENOUS; SUBCUTANEOUS EVERY 5 MIN PRN
Status: DISCONTINUED | OUTPATIENT
Start: 2024-02-19 | End: 2024-02-19 | Stop reason: HOSPADM

## 2024-02-19 RX ORDER — ONDANSETRON 4 MG/1
4 TABLET, ORALLY DISINTEGRATING ORAL EVERY 6 HOURS PRN
Qty: 30 TABLET | Refills: 0 | Status: SHIPPED | OUTPATIENT
Start: 2024-02-19

## 2024-02-19 RX ORDER — EPINEPHRINE 1 MG/ML
INJECTION, SOLUTION, CONCENTRATE INTRAVENOUS
Status: DISCONTINUED | OUTPATIENT
Start: 2024-02-19 | End: 2024-02-19 | Stop reason: HOSPADM

## 2024-02-19 RX ORDER — ONDANSETRON HYDROCHLORIDE 2 MG/ML
INJECTION, SOLUTION INTRAVENOUS
Status: DISCONTINUED | OUTPATIENT
Start: 2024-02-19 | End: 2024-02-19

## 2024-02-19 RX ORDER — MIDAZOLAM HYDROCHLORIDE 1 MG/ML
INJECTION INTRAMUSCULAR; INTRAVENOUS
Status: DISCONTINUED | OUTPATIENT
Start: 2024-02-19 | End: 2024-02-19

## 2024-02-19 RX ORDER — IPRATROPIUM BROMIDE AND ALBUTEROL SULFATE 2.5; .5 MG/3ML; MG/3ML
3 SOLUTION RESPIRATORY (INHALATION) ONCE
Status: DISCONTINUED | OUTPATIENT
Start: 2024-02-19 | End: 2024-02-19 | Stop reason: HOSPADM

## 2024-02-19 RX ORDER — BUPIVACAINE HYDROCHLORIDE 2.5 MG/ML
INJECTION, SOLUTION EPIDURAL; INFILTRATION; INTRACAUDAL
Status: DISCONTINUED | OUTPATIENT
Start: 2024-02-19 | End: 2024-02-19 | Stop reason: HOSPADM

## 2024-02-19 RX ADMIN — ONDANSETRON 8 MG: 2 INJECTION INTRAMUSCULAR; INTRAVENOUS at 07:02

## 2024-02-19 RX ADMIN — DIMENHYDRINATE 50 MG: 50 TABLET ORAL at 07:02

## 2024-02-19 RX ADMIN — MIDAZOLAM 2 MG: 1 INJECTION INTRAMUSCULAR; INTRAVENOUS at 07:02

## 2024-02-19 RX ADMIN — CEFAZOLIN 2 G: 1 INJECTION, POWDER, FOR SOLUTION INTRAMUSCULAR; INTRAVENOUS; PARENTERAL at 07:02

## 2024-02-19 RX ADMIN — SODIUM CHLORIDE: 9 INJECTION, SOLUTION INTRAVENOUS at 07:02

## 2024-02-19 RX ADMIN — DEXAMETHASONE SODIUM PHOSPHATE 8 MG: 4 INJECTION, SOLUTION INTRA-ARTICULAR; INTRALESIONAL; INTRAMUSCULAR; INTRAVENOUS; SOFT TISSUE at 07:02

## 2024-02-19 RX ADMIN — HYDROMORPHONE HYDROCHLORIDE 0.5 MG: 2 INJECTION INTRAMUSCULAR; INTRAVENOUS; SUBCUTANEOUS at 09:02

## 2024-02-19 RX ADMIN — FENTANYL CITRATE 100 MCG: 50 INJECTION INTRAMUSCULAR; INTRAVENOUS at 08:02

## 2024-02-19 RX ADMIN — ONDANSETRON HYDROCHLORIDE 4 MG: 4 TABLET, FILM COATED ORAL at 07:02

## 2024-02-19 RX ADMIN — PROPOFOL 150 MG: 10 INJECTION, EMULSION INTRAVENOUS at 07:02

## 2024-02-19 RX ADMIN — OXYCODONE 10 MG: 5 TABLET ORAL at 10:02

## 2024-02-19 RX ADMIN — FENTANYL CITRATE 100 MCG: 50 INJECTION INTRAMUSCULAR; INTRAVENOUS at 07:02

## 2024-02-19 RX ADMIN — LIDOCAINE HYDROCHLORIDE 80 MG: 20 INJECTION, SOLUTION INTRAVENOUS at 07:02

## 2024-02-19 NOTE — OP NOTE
[facility] - Orthopedic Periop Services  Operative Note    Surgery Date: 2/19/2024      Surgeon(s) and Role:     * Walter Ventura MD - Primary    Assistant: Amandeep Mejia    Pre-op Diagnosis:   Bucket-handle tear of medial meniscus of right knee as current injury, initial encounter [S83.211A]     Post-op Diagnosis:  Post-Op Diagnosis Codes:     * Bucket-handle tear of medial meniscus of right knee as current injury, initial encounter [S83.211A]     Procedure:  Right knee arthroscopic medial meniscus repair  Anesthesia:  General    EBL:  5 mL     Implants:   Implant Name Type Inv. Item Serial No.  Lot No. LRB No. Used Action   SYS NDL DELIVERY FAST  - [id number]  SYS NDL DELIVERY FAST   [employer] [id number] Right 1 Implanted   SYS NDL DELIVERY FAST  - [id number]  SYS NDL DELIVERY FAST   [employer] [id number] Right 1 Implanted   SYS FASTFIX REV CURVED  - [id number]  SYS FASTFIX REV CURVED   [employer] [id number] Right 1 Implanted   SYS FASTFIX REV CURVED  - [id number]  SYS FASTFIX REV CURVED   [employer] [id number] Right 1 Implanted   SYS NDL DELIVERY FAST  - [id number]  SYS NDL DELIVERY FAST   [employer] [id number] Right 1 Implanted       Tourniquet time: 0 mins    Complication:   none        INDICATIONS:  The patient is a 24 y.o. year-old who had a history and physical examination consistent with the aforementioned diagnoses.  The risks and benefits were discussed with the patient.  The patient acknowledged an understanding and wished to proceed with operative intervention.  Informed consent was obtained prior to the procedure.  Details of the surgical procedure were explained including incisions, probable rehabilitation course and description of the hardware and graft to be used was given.  The patient understands the likely length of convalescence after surgery and we reasonably explained the risks, benefits and alternatives to  surgery.  The reasonable expectations and potential complications were discussed and acknowledged including, but not limited to, infection, bleeding, blood clots, nerve injury, retear, instability and continued pain and stiffness.  It was also explained that there was a chance of failure, which may require further management.  The patient agreed, understood and wished to proceed.      Procedure: The patient was taken to the operating room and placed supine.  Anesthesia was administered and pre-operative antibiotics were given.  A timeout was performed.  Patient was positioned appropriately and prepped and draped in the usual sterile fashion.  Standard anteromedial and anterolateral portals were established and a diagnostic arthroscopy was performed; systemic examination of the joint revealed the following:     PF  negative compartmentalization or adhesions in the suprapatellar pouch.   Trochlear chondromalacia:none  Patella chondromalacia: none    Medial  Medial femoral chondyle chondromalacia : none  Medial tibial plateau chondromalacia none  Positive for meniscus tear.    Lateral  Lateral femoral condyle chondromalacia: none  Lateral tibial plateau chondromalacia: none  Negative for meniscus tear.      Examination of the intercondylar notch revealed: Normal ACL and PCL      The bucket-handle meniscus tear was quite evident on the medial aspect of the knee was displaced anteriorly with and nearly within the notch.  I created accessory portal anteromedially and began by abrading the torn surfaces of the meniscus to create a stable area for healing.  I felt that the meniscus was reducible and thus repairable.  After cleaning the area up with the use of a shaver I then utilized a combination of fast fix 360 all-inside suture devices.  In total I utilized 10 of these.  I started posteriorly and worked my way anteriorly fixating this very large displaced bucket-handle medial meniscus tear.  This was complex and required  nearly double the amount of time of a traditional meniscus repair and thus 22 modifier indicated in this case.  I then also utilized a PDS suture to pass a suture through the most anterior aspect of the tear and then shuttled a 3-0 FiberWire stitch through this area.  I slightly increased the size of our anteromedial portal and able to repair the meniscus to the capsule and then I tied this down with a sliding SMC knot.  We had a very excellent repair at this junction.  I then placed microfracture perforations within the notch and this completed the meniscus repair              This completed the procedure all instruments were removed. Portals were closed with 3-0 nylon.  0.25% bupivacaine was injected along the portal sites and sterile dressings were applied.           Disposition:awakened from anesthesia, extubated and taken to the recovery room in a stable condition, having suffered no apparent untoward event.

## 2024-02-19 NOTE — ANESTHESIA PROCEDURE NOTES
Intubation    Date/Time: 2/19/2024 7:41 AM    Performed by: Tyson Boucher CRNA  Authorized by: Aston Galicia MD    Intubation:     Induction:  Intravenous    Intubated:  N/a    Mask Ventilation:  N/a    Difficult Airway Encountered?: No      Airway Device:  Supraglottic airway/LMA    Airway Device Size:  4.0    Placement Verified By:  Capnometry    Complicating Factors:  None    Findings Post-Intubation:  Atraumatic/condition of teeth unchanged and BS equal bilateral

## 2024-02-19 NOTE — ANESTHESIA PREPROCEDURE EVALUATION
02/19/2024  Antonio Mejia is a 24 y.o., male.      Pre-op Assessment    I have reviewed the Patient Summary Reports.     I have reviewed the Nursing Notes. I have reviewed the NPO Status.   I have reviewed the Medications.     Review of Systems  Anesthesia Hx:             Denies Family Hx of Anesthesia complications.    Denies Personal Hx of Anesthesia complications.                    Social:  Non-Smoker, No Alcohol Use       Hematology/Oncology:  Hematology Normal   Oncology Normal                                   EENT/Dental:  EENT/Dental Normal           Cardiovascular:  Cardiovascular Normal                                            Pulmonary:  Pulmonary Normal                       Renal/:  Renal/ Normal                 Hepatic/GI:  Hepatic/GI Normal                 Musculoskeletal:  Musculoskeletal Normal                Neurological:  Neurology Normal                                      Endocrine:  Endocrine Normal            Dermatological:  Skin Normal    Psych:  Psychiatric Normal                    Physical Exam  General: Well nourished, Cooperative, Alert and Oriented    Airway:  Mallampati: II / II  Mouth Opening: Normal  TM Distance: Normal  Neck ROM: Normal ROM    Dental:  Intact    Chest/Lungs:  Clear to auscultation    Heart:  Rate: Normal  Rhythm: Regular Rhythm  Sounds: Normal        Anesthesia Plan  Type of Anesthesia, risks & benefits discussed:    Anesthesia Type: Gen Supraglottic Airway  Intra-op Monitoring Plan: Standard ASA Monitors  Post Op Pain Control Plan: multimodal analgesia  Induction:  IV  Airway Plan: Direct  Informed Consent: Informed consent signed with the Patient and all parties understand the risks and agree with anesthesia plan.  All questions answered.   ASA Score: 1  Day of Surgery Review of History & Physical: H&P Update referred to the  surgeon/provider.I have interviewed and examined the patient. I have reviewed the patient's H&P dated: There are no significant changes.     Ready For Surgery From Anesthesia Perspective.     .

## 2024-02-19 NOTE — PT/OT/SLP EVAL
Physical Therapy Evaluation    Patient Name:  Antonio Mejia   MRN:  [id number]    Recommendations:     Discharge Recommendations: Low Intensity Therapy   Discharge Equipment Recommendations: none   Barriers to discharge: None    Assessment:     Antonio Mejia is a 24 y.o. male admitted with a medical diagnosis of Bucket handle tear of medial meniscus of right knee.  He presents with the following impairments/functional limitations: decreased ROM, gait instability Patient with good understanding of post op education.    Rehab Prognosis: Good; patient would benefit from acute skilled PT services to address these deficits and reach maximum level of function.    Recent Surgery: Procedure(s) (LRB):  ARTHROSCOPY,KNEE,WITH MENISCUS REPAIR (Right)  ARTHROSCOPY, KNEE, WITH ABRASION ARTHROPLASTY OR MICROFRACTURE (Right) Day of Surgery    Plan:     During this hospitalization, patient to be seen 1 x/week to address the identified rehab impairments via gait training, therapeutic activities and progress toward the following goals:    Plan of Care Expires:  02/19/24    Subjective     Chief Complaint: Post op knee pain  Patient/Family Comments/goals: Plan is home today  Pain/Comfort:  Pain Rating 1: 5/10  Location - Side 1: Right  Location 1: knee  Pain Addressed 1: Pre-medicate for activity, Cessation of Activity    Patients cultural, spiritual, Spiritism conflicts given the current situation: no    Living Environment:  Lives with spouse, no steps entering home  Prior to admission, patients level of function was independent.  Equipment used at home: none.  DME owned (not currently used): none.  Upon discharge, patient will have assistance from family.    Objective:     Communicated with nurse prior to session.  Patient found supine with    upon PT entry to room.    General Precautions: Standard, fall  Orthopedic Precautions:    Braces: Hinged knee brace  Respiratory Status: Room air    Exams:  na    Functional  Mobility:  Gait: ambulated 20 feet with crutches nwb      AM-PAC 6 CLICK MOBILITY  Total Score:24       Treatment & Education:  Don/doff brace  Nwb with crutches  0-90 flexion limitation  Hep: meniscal repair protocol    Patient left supine with all lines intact.    GOALS:   Multidisciplinary Problems       Physical Therapy Goals       Not on file                    History:     Past Medical History:   Diagnosis Date    ADHD (attention deficit hyperactivity disorder)        Past Surgical History:   Procedure Laterality Date    HAND SURGERY      TONSILLECTOMY         Time Tracking:     PT Received On: 02/19/24  PT Start Time: 1100     PT Stop Time: 1135  PT Total Time (min): 35 min     Billable Minutes: Evaluation 35      02/19/2024

## 2024-02-19 NOTE — TRANSFER OF CARE
"Anesthesia Transfer of Care Note    Patient: Antonio LYONS Roberto    Procedure(s) Performed: Procedure(s) (LRB):  ARTHROSCOPY,KNEE,WITH MENISCUS REPAIR (Right)  ARTHROSCOPY, KNEE, WITH ABRASION ARTHROPLASTY OR MICROFRACTURE (Right)    Patient location: PACU    Anesthesia Type: general    Transport from OR: Transported from OR on room air with adequate spontaneous ventilation    Post pain: adequate analgesia    Post assessment: no apparent anesthetic complications    Post vital signs: stable    Level of consciousness: awake, alert and oriented    Nausea/Vomiting: no nausea/vomiting    Complications: none    Transfer of care protocol was followedComments: Refer to nursing note for pain malcom score upon discharge from recovery      Last vitals: Visit Vitals  /78   Pulse 60   Temp 36.7 °C (98.1 °F)   Resp 12   Ht 5' 10" (1.778 m)   Wt 95.3 kg (210 lb)   SpO2 99%   BMI 30.13 kg/m²     "

## 2024-02-19 NOTE — OR NURSING
0913-Pt rec'd to RR sedated, unresponsive to stimulation, oral airway present on arrival, resp even et unlabored, on room air, NADN, SaO2-99%, IV fluids infusing, ace wrap to right leg with immobilizer intact, cap refill < 3 secs, toes are cold to touch, + posterior tibial pulse noted, SINDY hose on left leg, will con't to monitor, safety measures in effect.    0928-Pt drowsy, responds to stimulation, oral airway removed, able to wiggle toes, c/o numbness, will con't to monitor.    0935-Pt c/o pain to right knee, pain rated 7, dilaudid 0.5 mg given, will con't to titrate for pain control, monitor.    0950-Pain improves to a 0, NADN, dressing remains C/D/I to right leg, orders to discharge to ASC room 24.    0955-Pt care released to Regina(RN), pt awake, wife at bedside, vs hr-83, resp-15, SaO2-96%, b/p 139/97.

## 2024-02-19 NOTE — PLAN OF CARE
NAME:_________________________________    DATE: _________________________________    PHYSICIAN:____________________________                             Walter Ventura MD            Meniscus Repair                                                                                                                        Post-operative Protocol  (see Return to Activity progressions below)    This was a large bucket handle meniscus tear.  Would recommend 4 weeks of NWB  Phase I - Maximum Protection (Weeks 0 to 4):    0 to 1 Week:  Brace locked in full extension for 4 weeks  then weight bearing as tolerated  Ice and modalities to reduce inflammation and pain   Range of Motion   -0° of knee extension   -90° of knee flexion for 4 weeks   Exercises   -Patella mobility in all planes   -Passive/active knee range of motion with 90° flexion limit   -Quadriceps setting emphasize VMO function   -Multi-plane straight leg raising   -Gait training    Weeks 1 to 4:  Continue with program as outlined in weeks 0 to 1  Range of Motion  -0° - 90° limitation  Exercises  -Initiate open kinetic chain multi-plane hip strengthening; progress to closed kinetic chain as swelling and pain permit  -Begin pool program working on ROM and light strengthening once incisions are healed  -Begin proprioceptive training, avoiding rotation  -Manual PNF hip and ankle strengthening    Phase II - Progressive Stretching and Early Strengthening (Weeks 4 to 6):    Weeks 4 to 6:  Continue with modalities to control pain and inflammation  Open brace 0° to 90° for 2 weeks  Range of Motion  -Full knee extension/hyperextension  -Gradual progression to full knee flexion  Exercises  -Continue with phase I program  -Continue to emphasize patella mobility and quality VMO function  -Begin bilateral closed kinetic chain strengthening, gradually progressing to unilateral as swelling, pain, and neuromuscular function allow  -Begin stationary cycle- low resistance  emphasizing endurance  -Gait training- normalize gait pattern  -Advance proprioception program    Phase III - Advanced Strengthening and Proprioceptive Phase (Weeks 6 to 10):    Weeks 6 to 8:  Continue with exercises in phase I and II  Wean out of brace over a 7 to 10 day period  Range of Motion  -Full knee range of motion  Exercises  -Patellar mobility  -Terminal stretching in flexion and extension  -Advance stationary biking program (increase intensity), introduce treadmill walking and elliptical   -Advance pool program, higher intensity strengthening    Weeks 8 to 10:  Continue with program as outlined in weeks 6 to 8  Range of Motion  -Full knee ROM  Exercises  -Advance unilateral closed kinetic chain program  -Advance pool program  -Begin pool running program, no bounding or jumping  -Begin multi-directional functional cord program avoiding rotation (carioca)  -Increase intensity on stationary bike, elliptical , and treadmill walking program, may begin interval programs  -Begin gym strengthening; squats, leg press, partial walk lunge, hamstring curls, ab/adduction, calf raises, and leg extensions (30° to 0° gradually increasing to full range of motion as patello-femoral arthrokinematics normalize    Phase IV - Advanced Strengthening and Plyometric Drills (Weeks 10 to 14):     Weeks 10 to 14:  Continue to advance overall strength and conditioning program, emphasize unilateral work with gym program  Begin straight plane running; emphasize distance and endurance  Begin bilateral plyometrics    Phase V - Return to Sport Phase (Weeks 14 to 16):    Weeks 14 to 16:  Begin sprinting program  Begin multi-directional drills  Advance plyometric drills from bilateral to unilateral  Follow-up examination with the physician  Sports test for return to play                                                       Return to Activity     Meniscal  Repair  ________________________________________________________________  Brace: Wear Brace for 4-6 weeks per physicians instructions  Crutches: 7-14 days, wean off as swelling, pain, strength, and function permit  Sleeping: With brace for 4-6 weeks  Drivin-2 weeks when FWB and off of pain medications  Showering: Immediately covered, then 3-5 days uncovered & not soaking                Stationary Bike: ~ 4 weeks post-operative    Walking Program: With brace on, beginning when swelling and normal gait permit         ~ 2 weeks    Pool Program: Deep water jogging/cycling and shallow water walking ~ 4 weeks     Elliptical Trainer: At approximately 6-8 weeks    Outdoor Biking: Flats in easy gears at 8-10 weeks post-operative.  Progress as tolerated.    Hiking: Easy groomed trails at 10 weeks    Swimming: Free-style at 8-10 weeks, breast stroke and flip turns at 4 months post-operative    Jogging: Initiate jogging in pool at 12-16 weeks, progress to dry land at 16-18 weeks in a straight plane and minimal incline/decline, progressing as tolerated    Sprinting: Begin at 18 weeks post-operatively    Downhill Skiing: May begin at weeks 20-24    Golf: Begin with putting at 12 weeks, chipping at 16 weeks, then progress to interval golf program at 20 weeks    Lateral Agilities: Begin at 12 weeks post-operatively, progressing over the next 6 weeks. Use sports brace for on-field work    Progressive sports specific drills: Begin at 12 weeks post-operatively, progressing over the next 4 weeks. For example:  Football pass patterns  Shooting basketball  Soccer dribbling  Karate forms      Return to full contact/impact/rotation sport: At 3-4 months post-operatively with sports brace, sports test, and physician approval.

## 2024-02-20 NOTE — ANESTHESIA POSTPROCEDURE EVALUATION
Anesthesia Post Evaluation    Patient: Antonio Mejia    Procedure(s) Performed: Procedure(s) (LRB):  ARTHROSCOPY,KNEE,WITH MENISCUS REPAIR (Right)  ARTHROSCOPY, KNEE, WITH ABRASION ARTHROPLASTY OR MICROFRACTURE (Right)    Final Anesthesia Type: general      Patient location during evaluation: PACU  Patient participation: Yes- Able to Participate  Level of consciousness: awake and alert  Post-procedure vital signs: reviewed and stable  Pain management: adequate  Airway patency: patent  ROGERS mitigation strategies: Multimodal analgesia  PONV status at discharge: No PONV  Anesthetic complications: no      Cardiovascular status: blood pressure returned to baseline  Respiratory status: unassisted  Hydration status: euvolemic  Follow-up not needed.              Vitals Value Taken Time   /52 02/19/24 1046   Temp 36.7 °C (98.1 °F) 02/19/24 0922   Pulse 84 02/19/24 1055   Resp 16 02/19/24 1045   SpO2 98 % 02/19/24 1055   Vitals shown include unvalidated device data.      Event Time   Out of Recovery 09:50:00         Pain/Elijah Score: Pain Rating Prior to Med Admin: 7 (2/19/2024 10:14 AM)  Pain Rating Post Med Admin: 0 (2/19/2024  9:43 AM)  Elijah Score: 10 (2/19/2024 11:26 AM)  Modified Elijah Score: 19 (2/19/2024 11:26 AM)

## 2024-02-23 ENCOUNTER — CLINICAL SUPPORT (OUTPATIENT)
Dept: REHABILITATION | Facility: HOSPITAL | Age: 24
End: 2024-02-23
Payer: OTHER GOVERNMENT

## 2024-02-23 DIAGNOSIS — S83.211A: Primary | ICD-10-CM

## 2024-02-23 DIAGNOSIS — S83.211S BUCKET-HANDLE TEAR OF MEDIAL MENISCUS OF RIGHT KNEE AS CURRENT INJURY, SEQUELA: ICD-10-CM

## 2024-02-23 PROCEDURE — 97110 THERAPEUTIC EXERCISES: CPT

## 2024-02-23 PROCEDURE — 97161 PT EVAL LOW COMPLEX 20 MIN: CPT

## 2024-02-23 NOTE — PLAN OF CARE
[facility]   Physical Therapy Initial Evaluation      Name: Antonio Mejia  Clinic Number: [id number]    Therapy Diagnosis:   Encounter Diagnosis   Name Primary?    Bucket handle tear of medial meniscus of right knee Yes        Physician: Walter Ventura MD    Physician Orders: PT Eval and Treat per Dr. Walter Ventura protocol  Medical Diagnosis from Referral: S83.211A (ICD-10-CM) - Bucket handle tear of medial meniscus of right knee  Evaluation Date: 2/23/2024  Authorization Period Expiration: 2/18/25  Plan of Care Expiration: 3/22/24  Progress Note Due: 3/22/24  Visit # / Visits authorized: 1/ 12   FOTO: 56/100    Precautions:  NWB for 4 weeks      Time In: 945  Time Out: 1015  Total Appointment Time (timed & untimed codes): 30 minutes    Subjective     Date of onset: October 2023    History of current condition - Nolan reports: he initially injured his right knee in October during  deployment. He then had surgery on 2/19/24 to repair a large bucket handle tear of his right medial meniscus. He is here now to begin his outpatient rehab.    Falls: none    Imaging: none:     Prior Therapy: none  Social History:  lives with their family  Occupation:   Prior Level of Function: independent  Current Level of Function: Modified independent with crutches    Pain:  Current 0/10, worst 4/10, best 0/10   Location: right  knee  Description: Dull and Sharp  Aggravating Factors: Bending  Easing Factors: pain medication    Patients goals: to fully rehab his knee     Medical History:   Past Medical History:   Diagnosis Date    ADHD (attention deficit hyperactivity disorder)        Surgical History:   Antonio Mejia  has a past surgical history that includes Tonsillectomy; Hand surgery; arthroscopy,knee,with meniscus repair (Right, 2/19/2024); and arthroscopy, knee, with abrasion arthroplasty or microfracture (Right, 2/19/2024).    Medications:   Antonio has a current medication  list which includes the following prescription(s): celecoxib, fluticasone propionate, ondansetron, and oxycodone-acetaminophen.    Allergies:   Review of patient's allergies indicates:  No Known Allergies     Objective      Incisions: right knee  Girth Measurements: Right 42 cm Left 39.5  Comments:      Range of motion:  Motion Right Left    Hip flexion  WITHIN FUNCTIONAL LIMITS  WITHIN FUNCTIONAL LIMITS   Hip extension  WITHIN FUNCTIONAL LIMITS  WITHIN FUNCTIONAL LIMITS   Hip abduction  WITHIN FUNCTIONAL LIMITS  WITHIN FUNCTIONAL LIMITS   Hip adduction  WITHIN FUNCTIONAL LIMITS  WITHIN FUNCTIONAL LIMITS   Internal rotation  WITHIN FUNCTIONAL LIMITS  WITHIN FUNCTIONAL LIMITS   External rotation  WITHIN FUNCTIONAL LIMITS  WITHIN FUNCTIONAL LIMITS   Knee extension   0  WITHIN FUNCTIONAL LIMITS   Knee flexion   50  WITHIN FUNCTIONAL LIMITS   Knee extension lag -20 N/A   Ankle DF  WITHIN FUNCTIONAL LIMITS  WITHIN FUNCTIONAL LIMITS   Ankle PF  WITHIN FUNCTIONAL LIMITS  WITHIN FUNCTIONAL LIMITS   Ankle Inversion  WITHIN FUNCTIONAL LIMITS  WITHIN FUNCTIONAL LIMITS   Ankle Eversion  WITHIN FUNCTIONAL LIMITS  WITHIN FUNCTIONAL LIMITS       Manual muscle test   Muscle Right  Left    Hip flexion  MMT strength: 3/5  MMT strength: 5/5   Hip extension  MMT strength: 3/5  MMT strength: 5/5   Hip abduction  MMT strength: 3/5  MMT strength: 5/5   Hip adduction  MMT strength: 5/5  MMT strength: 5/5   Hip internal rotation  MMT strength: 3/5  MMT strength: 5/5   Hip external rotation  MMT strength: 3/5  MMT strength: 5/5   Knee extension  MMT strength: 3/5  MMT strength: 5/5   Knee flexion  MMT strength: 3/5  MMT strength: 5/5   Ankle DF  MMT strength: 3/5  MMT strength: 5/5   Ankle PF  MMT strength: 3/5  MMT strength: 5/5   Ankle inversion  MMT strength: 3/5  MMT strength: 5/5   Ankle eversion  MMT strength: 3/5  MMT strength: 5/5       Gait:  Weight bearing precautions: NWB  Assistive device: axillary crutches  Ambulation distance  and deviations: independent home and community  Stairs:  Comments:          Limitation/Restriction for FOTO Knee Survey    Therapist reviewed FOTO scores for Antonio Mejia on 2/23/2024.   FOTO documents entered into [employer] - see Media section.    Limitation Score: 56%         Treatment     Total Treatment time (time-based codes) separate from Evaluation: 15 minutes     Nolan received the treatments listed below:      therapeutic exercises to develop strength and ROM for 15 minutes including:  Quad sets 2 x 20  SLR 2 x 10  SAQ 2 x10  Seated towel slides 2 x 20          Patient Education and Home Exercises     Education provided:   - evaluation results discussed with patient.    Written Home Exercises Provided: yes. Exercises were reviewed and Nolan was able to demonstrate them prior to the end of the session.  Nolan demonstrated good  understanding of the education provided. See EMR under Patient Instructions for exercises provided during therapy sessions.    Assessment     Antonio is a 24 y.o. male referred to outpatient Physical Therapy with a medical diagnosis of Bucket handle tear of medial meniscus of right knee. Patient presents with pain, decreased joint ROM, decreased muscle strength, decreased weight bearing with gait.    Patient prognosis is Good.   Patient will benefit from skilled outpatient Physical Therapy to address the deficits stated above and in the chart below, provide patient /family education, and to maximize patientt's level of independence.     Plan of care discussed with patient: Yes  Patient's spiritual, cultural and educational needs considered and patient is agreeable to the plan of care and goals as stated below:     Anticipated Barriers for therapy: none    Medical Necessity is demonstrated by the following  History  Co-morbidities and personal factors that may impact the plan of care [] LOW: no personal factors / co-morbidities  [x] MODERATE: 1-2 personal factors /  co-morbidities  [] HIGH: 3+ personal factors / co-morbidities    Moderate / High Support Documentation:   Co-morbidities affecting plan of care: ADHD    Personal Factors:   no deficits     Examination  Body Structures and Functions, activity limitations and participation restrictions that may impact the plan of care [x] LOW: addressing 1-2 elements  [] MODERATE: 3+ elements  [] HIGH: 4+ elements (please support below)    Moderate / High Support Documentation:      Clinical Presentation [x] LOW: stable  [] MODERATE: Evolving  [] HIGH: Unstable     Decision Making/ Complexity Score: low       Goals:  Short Term Goals: 2 weeks     Pt will increase right knee flexion to 70 degrees, knee extension to 0 degrees, and quad lag to -5 to improve functional mobility.  Pt will tolerate 30 minutes of therapeutic exercise with left knee pain no greater than 4/10 to improve quality of life.  Pt will demonstrate independent performance of home exercise program.    Long Term Goals: 4 weeks   1. Pt will increase right knee flexion to 90 degrees and quad lag to 0 degrees to improve functional mobility and prepare for weight bearing.  2. Pt will increase right knee strength to 3-/5 to allow for weight bearing with gait.  3. Patient will reduce right knee edema by 2.0 cm to improve functional mobility.      Plan     Plan of care Certification: 2/23/2024 to 3/22/24.    Outpatient Physical Therapy 3 times weekly for 4 weeks to include the following interventions: Electrical Stimulation IFC, Manual Therapy, Moist Heat/ Ice, Neuromuscular Re-ed, Patient Education, Therapeutic Activities, and Therapeutic Exercise.     Dale Russ, PT

## 2024-02-26 ENCOUNTER — OFFICE VISIT (OUTPATIENT)
Dept: ORTHOPEDICS | Facility: CLINIC | Age: 24
End: 2024-02-26
Payer: OTHER GOVERNMENT

## 2024-02-26 ENCOUNTER — TELEPHONE (OUTPATIENT)
Dept: ORTHOPEDICS | Facility: CLINIC | Age: 24
End: 2024-02-26
Payer: OTHER GOVERNMENT

## 2024-02-26 DIAGNOSIS — Z98.890 S/P RIGHT KNEE ARTHROSCOPY: Primary | ICD-10-CM

## 2024-02-26 PROCEDURE — 99213 OFFICE O/P EST LOW 20 MIN: CPT | Mod: PBBFAC | Performed by: NURSE PRACTITIONER

## 2024-02-26 PROCEDURE — 99024 POSTOP FOLLOW-UP VISIT: CPT | Mod: ,,, | Performed by: NURSE PRACTITIONER

## 2024-02-26 RX ORDER — CYCLOBENZAPRINE HCL 10 MG
10 TABLET ORAL 2 TIMES DAILY PRN
Qty: 30 TABLET | Refills: 0 | Status: SHIPPED | OUTPATIENT
Start: 2024-02-26 | End: 2024-03-07

## 2024-02-26 NOTE — TELEPHONE ENCOUNTER
----- Message from Yolette Herrera sent at 2/26/2024 12:17 PM CST -----  Regarding: excuse  Patient need a work excuse for desk duty start March 18,2024 .Please e-mail to [email or web address]. Please call him @ [phone]

## 2024-02-26 NOTE — PROGRESS NOTES
HISTORY OF PRESENT ILLNESS:       No surgery found No surgery found      Pt is here today for First post-operative followup of his knee arthroscopy.      he is doing well.  He is currently doing PT at [facility].  He is 1 week postop right knee arthroscopy, doing well.  Incisions look good today.  Sutures removed and Steri-Strips applied.  He is strict nonweightbearing to the right lower extremity.  He is wearing his postop brace locked in full extension.  We did discuss his limitations.  He is requesting to go back to work desk duty.  We did discuss this would be okay desk duty only as long as he has not taking pain medications.  He is also complaining of spasms in his quad and unable to sleep    We have reviewed his findings and discussed plan of care and future treatment options, including the physical therapy plan.                                                                                     PHYSICAL EXAMINATION:     Incision sites healed well  No evidence of any erythema, infection or induration  Range of motion 0-90 degrees  Minimal effusion  2+ DP pulse  No swelling, no calf tenderness  - Alejandra's sign  Negative medial joint line tendernes  Moderate quad atrophy                                                                                 ASSESSMENT:                                                                                                                                               1. Status post above, doing well.                                                                                                                               PLAN:                                                                                                                                                     1. Continue with PT  2. Emphasized quad function.  3. I have discussed return to activity in detail.  4. he will see us back in 4 weeks.                                      5. All questions were answered and  he should contact us if he  has any questions or concerns in the interim.            Flexeril 10 mg p.o. b.i.d. as needed.  Return to clinic with Dr. Ventura in 4 weeks.  Continue physical therapy.  Reviewed plan of care in detail.  There are no Patient Instructions on file for this visit.

## 2024-02-27 ENCOUNTER — CLINICAL SUPPORT (OUTPATIENT)
Dept: REHABILITATION | Facility: HOSPITAL | Age: 24
End: 2024-02-27
Payer: OTHER GOVERNMENT

## 2024-02-27 DIAGNOSIS — S83.211S BUCKET-HANDLE TEAR OF MEDIAL MENISCUS OF RIGHT KNEE AS CURRENT INJURY, SEQUELA: Primary | ICD-10-CM

## 2024-02-27 PROCEDURE — 97140 MANUAL THERAPY 1/> REGIONS: CPT | Mod: CQ

## 2024-02-27 PROCEDURE — 97110 THERAPEUTIC EXERCISES: CPT | Mod: CQ

## 2024-02-27 NOTE — PROGRESS NOTES
[facility]   Physical Therapy Treatment Note      Name: Antonio LYONS Roberto  Clinic Number: [id number]    Therapy Diagnosis: No diagnosis found.  Physician: Walter Ventura MD    Visit Date: 2/27/2024    Physician Orders: PT Eval and Treat per Dr. Walter Ventura protocol  Medical Diagnosis from Referral: S83.211A (ICD-10-CM) - Bucket handle tear of medial meniscus of right knee  Evaluation Date: 2/23/2024  Authorization Period Expiration: 2/18/25  Plan of Care Expiration: 3/22/24  Plan of care Certification: 2/23/2024 to 3/22/24.  Progress Note Due: 3/22/24  Visit # / Visits authorized: 2/ 12   FOTO: 56/100     Precautions:  NWB for 4 weeks       Time In: 1045 AM  Time Out: 1130 AM  Total Appointment Time (timed & untimed codes): 45  minutes       PTA Visit #: 1/5       Subjective     Patient reports: no pain currently  He was compliant with home exercise program.    Pain: 0/10  Location: right knee      Objective      Objective Measures updated at progress report unless specified.     Treatment     Nolan received the treatments listed below:      therapeutic exercises to develop strength, endurance, ROM, flexibility of R knee  for  37  minutes including:  SLRs 3x10  Hip ADD 3x10 with 3 sec holds  Hip ABD 3x10  Heel slides 2x10 (90 degree limit)  Quad sets 3x10   SAQs 2x10 with 3 sec holds  Hip extension 3x10   R ankle all directions with TB 3x10    manual therapy techniques were applied to the RLE/R knee  for 8 minutes, including:  Patella mobs  HS stretching       hot pack for     minutes to .    cold pack for  minutes to .    Patient Education and Home Exercises       Education provided: yes    Written Home Exercises Provided: yes. Exercises were reviewed and Nolan was able to demonstrate them prior to the end of the session.  Nolan demonstrated good  understanding of the education provided. See Electronic Medical Record under Patient Instructions for exercises provided during therapy  sessions    Assessment     Pt tolerated Tx well today to promote strengthening of R quad/R knee, PTA provided skilled cues for posture and positioning with all exercises.   Pt was provided a shoe horn and sock aid with demonstration and education to aid with ease in donning and doffing R sock and shoe with less than 90 degrees on R knee flexion.    Nolan Is progressing well towards his goals.   Patient prognosis is Excellent.     Patient will continue to benefit from skilled outpatient physical therapy to address the deficits listed in the problem list box on initial evaluation, provide pt/family education and to maximize pt's level of independence in the home and community environment.     Patient's spiritual, cultural and educational needs considered and pt agreeable to plan of care and goals.     Anticipated barriers to physical therapy: none    Goals:     Short Term Goals: 2 weeks      Pt will increase right knee flexion to 70 degrees, knee extension to 0 degrees, and quad lag to -5 to improve functional mobility.  Pt will tolerate 30 minutes of therapeutic exercise with left knee pain no greater than 4/10 to improve quality of life.  Pt will demonstrate independent performance of home exercise program.     Long Term Goals: 4 weeks   1. Pt will increase right knee flexion to 90 degrees and quad lag to 0 degrees to improve functional mobility and prepare for weight bearing.  2. Pt will increase right knee strength to 3-/5 to allow for weight bearing with gait.  3. Patient will reduce right knee edema by 2.0 cm to improve functional mobility.    Plan     Pt will continue with POC.    Cheryl Brewer, KELEHCI

## 2024-02-27 NOTE — DISCHARGE SUMMARY
[facility] - [facility]  Discharge Note  Short Stay    Procedure(s) (LRB):  ARTHROSCOPY,KNEE,WITH MENISCUS REPAIR (Right)  ARTHROSCOPY, KNEE, WITH ABRASION ARTHROPLASTY OR MICROFRACTURE (Right)      OUTCOME: Patient tolerated treatment/procedure well without complication and is now ready for discharge.    DISPOSITION: Home or Self Care    FINAL DIAGNOSIS:  Bucket handle tear of medial meniscus of right knee    FOLLOWUP: In clinic    DISCHARGE INSTRUCTIONS:    Discharge Procedure Orders   Ambulatory referral/consult to Physical/Occupational Therapy   Standing Status: Future   Referral Priority: Routine Referral Type: Physical Medicine   Referral Reason: Specialty Services Required   Number of Visits Requested: 1     Diet general     Remove dressing in 72 hours   Order Comments: Remove dressing in 3 days.  Place band-aids over portal sites.     Call MD for:  temperature >100.4     Call MD for:  persistent nausea and vomiting     Call MD for:  severe uncontrolled pain     Call MD for:  difficulty breathing, headache or visual disturbances     Call MD for:  redness, tenderness, or signs of infection (pain, swelling, redness, odor or green/yellow discharge around incision site)     Call MD for:  hives     Call MD for:  persistent dizziness or light-headedness     Call MD for:  extreme fatigue     Weight bearing restrictions (specify)   Order Comments: Please refer to op note for therapy plan         Clinical Reference Documents Added to Patient Instructions         Document    ARTHROSCOPY DISCHARGE INSTRUCTIONS (ENGLISH)    MENISCUS TEAR ED (ENGLISH)            TIME SPENT ON DISCHARGE: 9 minutes

## 2024-02-28 ENCOUNTER — TELEPHONE (OUTPATIENT)
Dept: ORTHOPEDICS | Facility: CLINIC | Age: 24
End: 2024-02-28
Payer: OTHER GOVERNMENT

## 2024-02-28 RX ORDER — OXYCODONE AND ACETAMINOPHEN 10; 325 MG/1; MG/1
1 TABLET ORAL EVERY 6 HOURS PRN
Qty: 30 TABLET | Refills: 0 | Status: SHIPPED | OUTPATIENT
Start: 2024-02-28

## 2024-02-28 NOTE — TELEPHONE ENCOUNTER
----- Message from Susan Robin sent at 2/28/2024  1:06 PM CST -----  Refill oxycodone to [facility]. Call back # [phone]

## 2024-02-29 ENCOUNTER — CLINICAL SUPPORT (OUTPATIENT)
Dept: REHABILITATION | Facility: HOSPITAL | Age: 24
End: 2024-02-29
Payer: OTHER GOVERNMENT

## 2024-02-29 DIAGNOSIS — S83.211S BUCKET-HANDLE TEAR OF MEDIAL MENISCUS OF RIGHT KNEE AS CURRENT INJURY, SEQUELA: Primary | ICD-10-CM

## 2024-02-29 PROCEDURE — 97140 MANUAL THERAPY 1/> REGIONS: CPT | Mod: CQ

## 2024-02-29 PROCEDURE — 97110 THERAPEUTIC EXERCISES: CPT | Mod: CQ

## 2024-02-29 NOTE — PROGRESS NOTES
[facility]   Physical Therapy Treatment Note      Name: Antonio LYONS Roberto  Clinic Number: [id number]    Therapy Diagnosis:   Encounter Diagnosis   Name Primary?    Bucket-handle tear of medial meniscus of right knee as current injury, sequela Yes     Physician: Walter Ventura MD    Visit Date: 2/29/2024    Physician Orders: PT Eval and Treat per Dr. Walter Ventura protocol  Medical Diagnosis from Referral: S83.211A (ICD-10-CM) - Bucket handle tear of medial meniscus of right knee  Evaluation Date: 2/23/2024  Authorization Period Expiration: 2/18/25  Plan of Care Expiration: 3/22/24  Plan of care Certification: 2/23/2024 to 3/22/24.  Progress Note Due: 3/22/24  Visit # / Visits authorized: 3/ 12   FOTO: 56/100     Precautions:  NWB for 4 weeks       Time In: 1315 PM  Time Out: 1400 PM  Total Appointment Time (timed & untimed codes): 45  minutes       PTA Visit #: 2/5       Subjective     Patient reports: no pain currently  He was compliant with home exercise program.    Pain: 0/10  Location: right knee      Objective      Objective Measures updated at progress report unless specified.     Treatment     Nolan received the treatments listed below:      therapeutic exercises to develop strength, endurance, ROM, flexibility of R knee  for  35  minutes including:  SLRs 3x10   Hip ADD 3x10 with 3 sec holds  Hip ABD 3x10  Heel slides 3 x10 (90 degree limit)  Quad sets 3x10 with 3 sec holds  SAQs 3x10 with 3 sec holds  Hip extension 3x10   R ankle all directions with TB 3x10  Pt ascending/descending x 4 steps with bilateral crutches SBA    manual therapy techniques were applied to the RLE/R knee  for 10 minutes, including:  Patella mobs  HS stretching       hot pack for     minutes to .    cold pack for  minutes to .    Patient Education and Home Exercises       Education provided: yes    Written Home Exercises Provided: yes. Exercises were reviewed and Nolan was able to demonstrate them prior to the  end of the session.  Nolan demonstrated good  understanding of the education provided. See Electronic Medical Record under Patient Instructions for exercises provided during therapy sessions    Assessment     Pt tolerated Tx well today to promote strengthening of R quad/R knee, PTA provided skilled cues for posture and positioning with all exercises.   PTA provided pt skilled demonstration and education of  stair ambulation NWB  with bilateral crutches.  Pt demonstrated and verbalized understanding.     Nolan Is progressing well towards his goals.   Patient prognosis is Excellent.     Patient will continue to benefit from skilled outpatient physical therapy to address the deficits listed in the problem list box on initial evaluation, provide pt/family education and to maximize pt's level of independence in the home and community environment.     Patient's spiritual, cultural and educational needs considered and pt agreeable to plan of care and goals.     Anticipated barriers to physical therapy: none    Goals:     Short Term Goals: 2 weeks      Pt will increase right knee flexion to 70 degrees, knee extension to 0 degrees, and quad lag to -5 to improve functional mobility.  Pt will tolerate 30 minutes of therapeutic exercise with left knee pain no greater than 4/10 to improve quality of life.  Pt will demonstrate independent performance of home exercise program.     Long Term Goals: 4 weeks   1. Pt will increase right knee flexion to 90 degrees and quad lag to 0 degrees to improve functional mobility and prepare for weight bearing.  2. Pt will increase right knee strength to 3-/5 to allow for weight bearing with gait.  3. Patient will reduce right knee edema by 2.0 cm to improve functional mobility.    Plan     Pt will continue with POC.    Cheryl Brewer PTA

## 2024-03-01 ENCOUNTER — CLINICAL SUPPORT (OUTPATIENT)
Dept: REHABILITATION | Facility: HOSPITAL | Age: 24
End: 2024-03-01
Payer: OTHER GOVERNMENT

## 2024-03-01 DIAGNOSIS — S83.211S BUCKET-HANDLE TEAR OF MEDIAL MENISCUS OF RIGHT KNEE AS CURRENT INJURY, SEQUELA: Primary | ICD-10-CM

## 2024-03-01 PROCEDURE — 97110 THERAPEUTIC EXERCISES: CPT | Mod: CQ

## 2024-03-01 PROCEDURE — 97140 MANUAL THERAPY 1/> REGIONS: CPT | Mod: CQ

## 2024-03-01 NOTE — PROGRESS NOTES
[facility]   Physical Therapy Treatment Note      Name: Antonio LYONS Roberto  Clinic Number: [id number]    Therapy Diagnosis:   Encounter Diagnosis   Name Primary?    Bucket-handle tear of medial meniscus of right knee as current injury, sequela Yes     Physician: Walter Ventura MD    Visit Date: 3/1/2024    Physician Orders: PT Eval and Treat per Dr. Walter Ventura protocol  Medical Diagnosis from Referral: S83.211A (ICD-10-CM) - Bucket handle tear of medial meniscus of right knee  Evaluation Date: 2/23/2024  Authorization Period Expiration: 2/18/25  Plan of Care Expiration: 3/22/24  Plan of care Certification: 2/23/2024 to 3/22/24.  Progress Note Due: 3/22/24  Visit # / Visits authorized: 3/ 12   FOTO: 56/100     Precautions:  NWB for 4 weeks       Time In: 0915  AM  Time Out: 0953  AM  Total Appointment Time (timed & untimed codes): 38  minutes       PTA Visit #: 2/5       Subjective     Patient reports: no pain currently  He was compliant with home exercise program.    Pain: 0/10  Location: right knee      Objective      Objective Measures updated at progress report unless specified.     Treatment     Nolan received the treatments listed below:      therapeutic exercises to develop strength, endurance, ROM, flexibility of R knee  for  30  minutes including:    SLRs 3x10   Hip ADD 3x10 with 3 sec holds  Hip ABD 3x10  Heel slides 3 x10 (90 degree limit)  Quad sets 3x10 with 3 sec holds  SAQs 3x10 with 3 sec holds   Hip extension 3x10   R ankle all directions with TB 3x10  Pt ascending/descending x 4 steps with bilateral crutches SBA    manual therapy techniques were applied to the RLE/R knee  for 8 minutes, including:  Patella mobs  HS stretching       hot pack for     minutes to .    cold pack for  minutes to .    Patient Education and Home Exercises       Education provided: yes    Written Home Exercises Provided: yes. Exercises were reviewed and Nolan was able to demonstrate them prior to  the end of the session.  Nolan demonstrated good  understanding of the education provided. See Electronic Medical Record under Patient Instructions for exercises provided during therapy sessions    Assessment     Pt tolerated Tx well today to promote strengthening of R quad/R knee, PTA provided skilled cues for posture and positioning with all exercises.    Nolan Is progressing well towards his goals.   Patient prognosis is Excellent.     Patient will continue to benefit from skilled outpatient physical therapy to address the deficits listed in the problem list box on initial evaluation, provide pt/family education and to maximize pt's level of independence in the home and community environment.     Patient's spiritual, cultural and educational needs considered and pt agreeable to plan of care and goals.     Anticipated barriers to physical therapy: none    Goals:     Short Term Goals: 2 weeks      Pt will increase right knee flexion to 70 degrees, knee extension to 0 degrees, and quad lag to -5 to improve functional mobility.  Pt will tolerate 30 minutes of therapeutic exercise with left knee pain no greater than 4/10 to improve quality of life.  Pt will demonstrate independent performance of home exercise program.     Long Term Goals: 4 weeks   1. Pt will increase right knee flexion to 90 degrees and quad lag to 0 degrees to improve functional mobility and prepare for weight bearing.  2. Pt will increase right knee strength to 3-/5 to allow for weight bearing with gait.  3. Patient will reduce right knee edema by 2.0 cm to improve functional mobility.    Plan     Pt will continue with POC.    Cheryl Brewer PTA

## 2024-03-04 ENCOUNTER — CLINICAL SUPPORT (OUTPATIENT)
Dept: REHABILITATION | Facility: HOSPITAL | Age: 24
End: 2024-03-04
Payer: OTHER GOVERNMENT

## 2024-03-04 DIAGNOSIS — S83.211S BUCKET-HANDLE TEAR OF MEDIAL MENISCUS OF RIGHT KNEE AS CURRENT INJURY, SEQUELA: Primary | ICD-10-CM

## 2024-03-04 PROCEDURE — 97110 THERAPEUTIC EXERCISES: CPT | Mod: CQ

## 2024-03-04 PROCEDURE — 97140 MANUAL THERAPY 1/> REGIONS: CPT | Mod: CQ

## 2024-03-04 NOTE — PROGRESS NOTES
[facility]   Physical Therapy Treatment Note      Name: Antonio LYONS Roberto  Clinic Number: [id number]    Therapy Diagnosis:   Encounter Diagnosis   Name Primary?    Bucket-handle tear of medial meniscus of right knee as current injury, sequela Yes     Physician: Walter Ventura MD    Visit Date: 3/4/2024    Physician Orders: PT Eval and Treat per Dr. Walter Ventura protocol  Medical Diagnosis from Referral: S83.211A (ICD-10-CM) - Bucket handle tear of medial meniscus of right knee  Evaluation Date: 2/23/2024  Authorization Period Expiration: 2/18/25  Plan of Care Expiration: 3/22/24  Plan of care Certification: 2/23/2024 to 3/22/24.  Progress Note Due: 3/22/24  Visit # / Visits authorized: 5/ 12   FOTO: 56/100     Precautions:  NWB for 4 weeks       Time In: 1210 PM  Time Out: 1250  PM  Total Appointment Time (timed & untimed codes):   40  minutes       PTA Visit #: 4/5       Subjective     Patient reports: no pain currently  He was compliant with home exercise program.    Pain: 0/10  Location: right knee      Objective      Objective Measures updated at progress report unless specified.     Treatment     Nolan received the treatments listed below:      therapeutic exercises to develop strength, endurance, ROM, flexibility of RLE for 30  minutes including:    SLRs 3x10   Hip ADD 3x10 with 3 sec holds  Hip ABD 3x10  Heel slides 3 x10 (90 degree limit)  Quad sets 3x10 with 3 sec holds  Standing hip SLR 3x10 // bars  Standing hip ABD 3x10  // bars  Standing hip Ext 3x10  // bars   SAQs 3x10 with 3 sec holds   Hip extension  prone 3x10   Standing RLE heel raises // bars  Standing RLE squats // bars  R ankle all directions with  BTB 3x10  Pt ascending/descending x 4 steps with bilateral crutches SBA (not this visit)    manual therapy techniques were applied to the RLE/R knee  for 10 minutes, including:  Patella mobs  HS stretching       hot pack for     minutes to .    cold pack for  minutes to  .    Patient Education and Home Exercises       Education provided: yes    Written Home Exercises Provided: yes. Exercises were reviewed and Nolan was able to demonstrate them prior to the end of the session.  Nolan demonstrated good  understanding of the education provided. See Electronic Medical Record under Patient Instructions for exercises provided during therapy sessions    Assessment     Pt tolerated Tx well today to promote strengthening of R quad/R knee, PTA provided skilled cues for posture and positioning with all exercises.    Nolan Is progressing well towards his goals.   Patient prognosis is Excellent.     Patient will continue to benefit from skilled outpatient physical therapy to address the deficits listed in the problem list box on initial evaluation, provide pt/family education and to maximize pt's level of independence in the home and community environment.     Patient's spiritual, cultural and educational needs considered and pt agreeable to plan of care and goals.     Anticipated barriers to physical therapy: none    Goals:     Short Term Goals: 2 weeks      Pt will increase right knee flexion to 70 degrees, knee extension to 0 degrees, and quad lag to -5 to improve functional mobility.  Pt will tolerate 30 minutes of therapeutic exercise with left knee pain no greater than 4/10 to improve quality of life.  Pt will demonstrate independent performance of home exercise program.     Long Term Goals: 4 weeks   1. Pt will increase right knee flexion to 90 degrees and quad lag to 0 degrees to improve functional mobility and prepare for weight bearing.  2. Pt will increase right knee strength to 3-/5 to allow for weight bearing with gait.  3. Patient will reduce right knee edema by 2.0 cm to improve functional mobility.    Plan     Pt will continue with POC.    Cheryl Brewer, PTA

## 2024-03-06 ENCOUNTER — CLINICAL SUPPORT (OUTPATIENT)
Dept: REHABILITATION | Facility: HOSPITAL | Age: 24
End: 2024-03-06
Payer: OTHER GOVERNMENT

## 2024-03-06 DIAGNOSIS — S83.211S BUCKET-HANDLE TEAR OF MEDIAL MENISCUS OF RIGHT KNEE AS CURRENT INJURY, SEQUELA: Primary | ICD-10-CM

## 2024-03-06 PROCEDURE — 97110 THERAPEUTIC EXERCISES: CPT | Mod: CQ

## 2024-03-06 PROCEDURE — 97140 MANUAL THERAPY 1/> REGIONS: CPT | Mod: CQ

## 2024-03-06 NOTE — PROGRESS NOTES
[facility]   Physical Therapy Treatment Note      Name: Antonio LYONS Roberto  Clinic Number: [id number]    Therapy Diagnosis:   Encounter Diagnosis   Name Primary?    Bucket-handle tear of medial meniscus of right knee as current injury, sequela Yes     Physician: Walter Ventura MD    Visit Date: 3/6/2024    Physician Orders: PT Eval and Treat per Dr. Walter Ventura protocol  Medical Diagnosis from Referral: S83.211A (ICD-10-CM) - Bucket handle tear of medial meniscus of right knee  Evaluation Date: 2/23/2024  Authorization Period Expiration: 2/18/25  Plan of Care Expiration: 3/22/24  Plan of care Certification: 2/23/2024 to 3/22/24.  Progress Note Due: 3/22/24  Visit # / Visits authorized: 6/12   FOTO: 56/100     Precautions:  NWB for 4 weeks       Time In:  0920 AM  Time Out: 1005 AM   Total Appointment Time (timed & untimed codes):  45   minutes       PTA Visit #: 5/5       Subjective     Patient reports: no pain currently  He was compliant with home exercise program.    Pain: 0/10  Location: right knee      Objective      Objective Measures updated at progress report unless specified.     Treatment     Nolan received the treatments listed below:      therapeutic exercises to develop strength, endurance, ROM, flexibility of RLE for 35  minutes including:    SLRs 3x10   Hip ADD 3x10 with 3 sec holds  Hip ABD 3x10  Heel slides 3 x10 (90 degree limit)   Quad sets 3x10 with 3 sec holds   Standing hip SLR 3x10 // bars  Standing hip ABD 3x10  // bars  Standing hip Ext 3x10  // bars   SAQs 3x10 with 3 sec holds   Hip extension  prone 3x10   Standing RLE heel raises // bars    Standing RLE squats // bars   R ankle all directions with  BTB 3x10  Pt ascending/descending x 4 steps with bilateral crutches SBA (not this visit)      manual therapy techniques were applied to the RLE/R knee  for 10 minutes, including:  Patella mobs  HS stretching       hot pack for     minutes to .    cold pack for  minutes  to .    Patient Education and Home Exercises       Education provided: yes    Written Home Exercises Provided: yes. Exercises were reviewed and Nolan was able to demonstrate them prior to the end of the session.  Nolan demonstrated good  understanding of the education provided. See Electronic Medical Record under Patient Instructions for exercises provided during therapy sessions    Assessment     Pt tolerated Tx well today to promote strengthening of R quad/R knee, PTA provided skilled cues for posture and positioning with all exercises.    Nolan Is progressing well towards his goals.   Patient prognosis is Excellent.     Patient will continue to benefit from skilled outpatient physical therapy to address the deficits listed in the problem list box on initial evaluation, provide pt/family education and to maximize pt's level of independence in the home and community environment.     Patient's spiritual, cultural and educational needs considered and pt agreeable to plan of care and goals.     Anticipated barriers to physical therapy: none    Goals:     Short Term Goals: 2 weeks      Pt will increase right knee flexion to 70 degrees, knee extension to 0 degrees, and quad lag to -5 to improve functional mobility.  Pt will tolerate 30 minutes of therapeutic exercise with left knee pain no greater than 4/10 to improve quality of life.  Pt will demonstrate independent performance of home exercise program.     Long Term Goals: 4 weeks   1. Pt will increase right knee flexion to 90 degrees and quad lag to 0 degrees to improve functional mobility and prepare for weight bearing.  2. Pt will increase right knee strength to 3-/5 to allow for weight bearing with gait.  3. Patient will reduce right knee edema by 2.0 cm to improve functional mobility.    Plan     Pt will continue with POC.    Cheryl Brewer, PTA

## 2024-03-08 ENCOUNTER — CLINICAL SUPPORT (OUTPATIENT)
Dept: REHABILITATION | Facility: HOSPITAL | Age: 24
End: 2024-03-08
Payer: OTHER GOVERNMENT

## 2024-03-08 DIAGNOSIS — S83.211A BUCKET-HANDLE TEAR OF MEDIAL MENISCUS OF RIGHT KNEE AS CURRENT INJURY, INITIAL ENCOUNTER: Primary | ICD-10-CM

## 2024-03-08 PROCEDURE — 97110 THERAPEUTIC EXERCISES: CPT

## 2024-03-08 NOTE — PROGRESS NOTES
[facility]   Physical Therapy Treatment Note      Name: Antonio Mejia  Clinic Number: [id number]    Therapy Diagnosis:   No diagnosis found.    Physician: Walter Ventura MD    Visit Date: 3/8/2024    Physician Orders: PT Eval and Treat per Dr. Walter Ventura protocol  Medical Diagnosis from Referral: S83.211A (ICD-10-CM) - Bucket handle tear of medial meniscus of right knee  Evaluation Date: 2/23/2024  Authorization Period Expiration: 2/18/25  Plan of Care Expiration: 3/22/24  Plan of care Certification: 2/23/2024 to 3/22/24.  Progress Note Due: 3/22/24  Visit # / Visits authorized: 7/12   FOTO: 56/100     Precautions:  NWB for 4 weeks       Time In:  0916 AM  Time Out: 1012 AM   Total Appointment Time (timed & untimed codes):  56   minutes       PTA Visit #: 0/5       Subjective     Patient reports: Knee is a little sore today  He was compliant with home exercise program.    Pain: 2/10  Location: right knee      Objective      Knee Active Range of Motion 0-95 during session and no extensor lag on concentric phase but 5 degree lag during eccentric phase    Treatment     Nolan received the treatments listed below:      therapeutic exercises to develop strength, endurance, ROM, flexibility of RLE for 50  minutes including:  Recumbent bike x 6 min with half revolutions to gently increase flexibility and ROM   Quad sets 3x10 with 3 sec holds   SAQ 3 x 10 with 2 second hold  SLRs 3x10 with derotation for VMO recruitment. With verbal cues for avoiding extensor lag during eccentric phase.   Hip ADD 3x10 with 3 sec holds ( not this visit)   Sidelying Hip ABD 3x10  LAQ x 20 with verbal cues for eccentric control     Heel slides 3 x10 (90 degree limit)   All remaining exercises held today.   Standing hip SLR 3x10 // bars  Standing hip ABD 3x10  // bars  Standing hip Ext 3x10  // bars   SAQs 3x10 with 3 sec holds   Hip extension  prone 3x10   Standing RLE heel raises // bars    Standing RLE squats  // bars   R ankle all directions with  BTB 3x10  Pt ascending/descending x 4 steps with bilateral crutches SBA (not this visit)      manual therapy techniques were applied to the RLE/R knee  for 0 minutes, including:  Patella mobs (not this visit)  HS stretching (not this visit)      Patient Education and Home Exercises       Education provided: yes    Written Home Exercises Provided: yes. Exercises were reviewed and Nolan was able to demonstrate them prior to the end of the session.  Nolan demonstrated good  understanding of the education provided. See Electronic Medical Record under Patient Instructions for exercises provided during therapy sessions    Assessment     Pt with good participation.  Emphasized gentle ROM and VMO recruitment in quad.     Nolan Is progressing well towards his goals.   Patient prognosis is Excellent.     Patient will continue to benefit from skilled outpatient physical therapy to address the deficits listed in the problem list box on initial evaluation, provide pt/family education and to maximize pt's level of independence in the home and community environment.     Patient's spiritual, cultural and educational needs considered and pt agreeable to plan of care and goals.     Anticipated barriers to physical therapy: none    Goals:     Short Term Goals: 2 weeks      Pt will increase right knee flexion to 70 degrees, knee extension to 0 degrees, and quad lag to -5 to improve functional mobility.  Pt will tolerate 30 minutes of therapeutic exercise with left knee pain no greater than 4/10 to improve quality of life.  Pt will demonstrate independent performance of home exercise program.     Long Term Goals: 4 weeks   1. Pt will increase right knee flexion to 90 degrees and quad lag to 0 degrees to improve functional mobility and prepare for weight bearing.  2. Pt will increase right knee strength to 3-/5 to allow for weight bearing with gait.  3. Patient will reduce right knee edema by 2.0 cm  to improve functional mobility.    Plan     Pt will continue with Plan of care with progression as appropriate    ROCHELLE FOFANA, PT, ATP  03/08/2024

## 2024-03-11 ENCOUNTER — CLINICAL SUPPORT (OUTPATIENT)
Dept: REHABILITATION | Facility: HOSPITAL | Age: 24
End: 2024-03-11
Payer: OTHER GOVERNMENT

## 2024-03-11 DIAGNOSIS — S83.211A BUCKET-HANDLE TEAR OF MEDIAL MENISCUS OF RIGHT KNEE AS CURRENT INJURY, INITIAL ENCOUNTER: Primary | ICD-10-CM

## 2024-03-11 PROCEDURE — 97140 MANUAL THERAPY 1/> REGIONS: CPT | Mod: CQ

## 2024-03-11 PROCEDURE — 97110 THERAPEUTIC EXERCISES: CPT | Mod: CQ

## 2024-03-11 NOTE — PROGRESS NOTES
[facility]   Physical Therapy Treatment Note      Name: Antonio LYONS Roberto  Clinic Number: [id number]    Therapy Diagnosis:   Encounter Diagnosis   Name Primary?    Bucket-handle tear of medial meniscus of right knee as current injury, initial encounter Yes       Physician: Walter Ventura MD    Visit Date: 3/11/2024    Physician Orders: PT Eval and Treat per Dr. Walter Ventura protocol  Medical Diagnosis from Referral: S83.211A (ICD-10-CM) - Bucket handle tear of medial meniscus of right knee  Evaluation Date: 2/23/2024  Authorization Period Expiration: 2/18/25  Plan of Care Expiration: 3/22/24  Plan of care Certification: 2/23/2024 to 3/22/24.  Progress Note Due: 3/22/24  Visit # / Visits authorized: 8/12   FOTO: 56/100     Precautions:  NWB for 4 weeks       Time In:  1300 PM  Time Out:  1345  PM   Total Appointment Time (timed & untimed codes):  45   minutes       PTA Visit #: 1/5       Subjective     Patient reports: minimal pain today.  He was compliant with home exercise program.    Pain: 4/10  Location: right knee      Objective      Knee Active Range of Motion 0-95 during session and no extensor lag on concentric phase but 5 degree lag during eccentric phase    Treatment     Nolan received the treatments listed below:      therapeutic exercises to develop strength, endurance, ROM, flexibility of RLE for 35  minutes including:  Recumbent bike x 6 min with half revolutions to gently increase flexibility and ROM   Quad sets 3x10 with 3 sec holds   SAQ 3 x 10 with 2 second hold  SLRs 3x10 with derotation for VMO recruitment. With verbal cues for avoiding extensor lag during eccentric phase.   Hip ADD 3x10 with 3 sec holds   Sidelying Hip ABD 3x10  LAQ x 20 with verbal cues for eccentric control   Heel slides 3 x10 (90 degree limit)   Standing hip SLR 3x10 // bars RLE  Standing hip ABD 3x10  // bars RLE  Standing hip Ext 3x10  // bars RLE  SAQs 3x10 with 3 sec holds   Hip extension  prone  3x10   R ankle all directions with  BTB 3x10 (not today)       manual therapy techniques were applied to the RLE/R knee  for 10 minutes, including:  Patella mobs   HS stretching       Patient Education and Home Exercises       Education provided: yes    Written Home Exercises Provided: yes. Exercises were reviewed and Nolan was able to demonstrate them prior to the end of the session.  Nolan demonstrated good  understanding of the education provided. See Electronic Medical Record under Patient Instructions for exercises provided during therapy sessions    Assessment     Pt tolerated Tx well today to promote increased R knee /R quad strengthening.  PTA provided skilled cues for correct posture/positioning with all exercises.    Nolan Is progressing well towards his goals.   Patient prognosis is Excellent.     Patient will continue to benefit from skilled outpatient physical therapy to address the deficits listed in the problem list box on initial evaluation, provide pt/family education and to maximize pt's level of independence in the home and community environment.     Patient's spiritual, cultural and educational needs considered and pt agreeable to plan of care and goals.     Anticipated barriers to physical therapy: none    Goals:     Short Term Goals: 2 weeks      Pt will increase right knee flexion to 70 degrees, knee extension to 0 degrees, and quad lag to -5 to improve functional mobility.  Pt will tolerate 30 minutes of therapeutic exercise with left knee pain no greater than 4/10 to improve quality of life.  Pt will demonstrate independent performance of home exercise program.     Long Term Goals: 4 weeks   1. Pt will increase right knee flexion to 90 degrees and quad lag to 0 degrees to improve functional mobility and prepare for weight bearing.  2. Pt will increase right knee strength to 3-/5 to allow for weight bearing with gait.  3. Patient will reduce right knee edema by 2.0 cm to improve functional  mobility.    Plan     Pt will continue with Plan of care with progression as appropriate    Cheryl Brewer, PTA, ATP  03/11/2024

## 2024-03-13 ENCOUNTER — CLINICAL SUPPORT (OUTPATIENT)
Dept: REHABILITATION | Facility: HOSPITAL | Age: 24
End: 2024-03-13
Payer: OTHER GOVERNMENT

## 2024-03-13 DIAGNOSIS — S83.211A BUCKET-HANDLE TEAR OF MEDIAL MENISCUS OF RIGHT KNEE AS CURRENT INJURY, INITIAL ENCOUNTER: Primary | ICD-10-CM

## 2024-03-13 PROCEDURE — 97110 THERAPEUTIC EXERCISES: CPT | Mod: CQ

## 2024-03-13 NOTE — PROGRESS NOTES
[facility]   Physical Therapy Treatment Note      Name: Antonio LYONS Roberto  Clinic Number: [id number]    Therapy Diagnosis:   Encounter Diagnosis   Name Primary?    Bucket-handle tear of medial meniscus of right knee as current injury, initial encounter Yes       Physician: Walter Ventura MD    Visit Date: 3/13/2024    Physician Orders: PT Eval and Treat per Dr. Walter Ventura protocol  Medical Diagnosis from Referral: S83.211A (ICD-10-CM) - Bucket handle tear of medial meniscus of right knee  Evaluation Date: 2/23/2024  Authorization Period Expiration: 2/18/25  Plan of Care Expiration: 3/22/24  Plan of care Certification: 2/23/2024 to 3/22/24.  Progress Note Due: 3/22/24  Visit # / Visits authorized: 9/12   FOTO: 56/100     Precautions:  NWB for 4 weeks       Time In:  1300 PM  Time Out: 1345   PM   Total Appointment Time (timed & untimed codes): 45  minutes       PTA Visit #: 2/5       Subjective     Patient reports: no pain stated  He was compliant with home exercise program.    Pain: 0/10  Location: right knee      Objective      Knee Active Range of Motion 0-95 during session and no extensor lag on concentric phase but 5 degree lag during eccentric phase    Treatment     Nolan received the treatments listed below:      therapeutic exercises to develop strength, endurance, ROM, flexibility of RLE for 45  minutes including:  Recumbent bike x 6 min with half revolutions to gently increase flexibility and ROM   Quad sets 3x10 with 3 sec holds   SAQ 3 x 10 with 2 second hold   SLRs 3x10 with derotation for VMO recruitment. With verbal cues for avoiding extensor lag during eccentric phase.   Hip ADD 3x10 with 3 sec holds   Sidelying Hip ABD 3x10  LAQ x 20 with verbal cues for eccentric control   Heel slides 3 x10 (90 degree limit)   Standing hip SLR 3x10 // bars RLE  Standing hip ABD 3x10  // bars RLE  Standing hip Ext 3x10  // bars RLE  SAQs 3x10 with 3 sec holds   Hip extension  prone 3x10    R ankle all directions with  BTB 3x10 (not today)       (Not This Visit)  manual therapy techniques were applied to the RLE/R knee  for 10 minutes, including:  Patella mobs   HS stretching       Patient Education and Home Exercises       Education provided: yes    Written Home Exercises Provided: yes. Exercises were reviewed and Nolan was able to demonstrate them prior to the end of the session.  Nolan demonstrated good  understanding of the education provided. See Electronic Medical Record under Patient Instructions for exercises provided during therapy sessions    Assessment     Pt tolerated Tx well today to promote increased R knee /R quad strengthening.  PTA provided skilled cues for correct posture/positioning with all exercises.    Nolan Is progressing well towards his goals.   Patient prognosis is Excellent.     Patient will continue to benefit from skilled outpatient physical therapy to address the deficits listed in the problem list box on initial evaluation, provide pt/family education and to maximize pt's level of independence in the home and community environment.     Patient's spiritual, cultural and educational needs considered and pt agreeable to plan of care and goals.     Anticipated barriers to physical therapy: none    Goals:     Short Term Goals: 2 weeks      Pt will increase right knee flexion to 70 degrees, knee extension to 0 degrees, and quad lag to -5 to improve functional mobility.  Pt will tolerate 30 minutes of therapeutic exercise with left knee pain no greater than 4/10 to improve quality of life.  Pt will demonstrate independent performance of home exercise program.     Long Term Goals: 4 weeks   1. Pt will increase right knee flexion to 90 degrees and quad lag to 0 degrees to improve functional mobility and prepare for weight bearing.  2. Pt will increase right knee strength to 3-/5 to allow for weight bearing with gait.  3. Patient will reduce right knee edema by 2.0 cm to improve  functional mobility.    Plan     Pt will continue with Plan of care with progression as appropriate    Cheryl Brewer, PTA, ATP  03/13/2024

## 2024-03-15 ENCOUNTER — CLINICAL SUPPORT (OUTPATIENT)
Dept: REHABILITATION | Facility: HOSPITAL | Age: 24
End: 2024-03-15
Payer: OTHER GOVERNMENT

## 2024-03-15 DIAGNOSIS — S83.211A BUCKET-HANDLE TEAR OF MEDIAL MENISCUS OF RIGHT KNEE AS CURRENT INJURY, INITIAL ENCOUNTER: Primary | ICD-10-CM

## 2024-03-15 PROCEDURE — 97110 THERAPEUTIC EXERCISES: CPT | Mod: CQ

## 2024-03-15 NOTE — PROGRESS NOTES
[facility]   Physical Therapy Treatment Note      Name: Antonio LYONS Roberto  Clinic Number: [id number]    Therapy Diagnosis:   Encounter Diagnosis   Name Primary?    Bucket-handle tear of medial meniscus of right knee as current injury, initial encounter Yes       Physician: Walter Ventura MD    Visit Date: 3/15/2024    Physician Orders: PT Eval and Treat per Dr. Walter Ventura protocol  Medical Diagnosis from Referral: S83.211A (ICD-10-CM) - Bucket handle tear of medial meniscus of right knee  Evaluation Date: 2/23/2024  Authorization Period Expiration: 2/18/25  Plan of Care Expiration: 3/22/24  Plan of care Certification: 2/23/2024 to 3/22/24.  Progress Note Due: 3/22/24  Visit # / Visits authorized: 10/12   FOTO: 56/100     Precautions:  NWB for 4 weeks       Time In: 0840 AM  Time Out:  0915 AM  Total Appointment Time (timed & untimed codes): 35  minutes       PTA Visit #: 3/5       Subjective     Patient reports: no pain stated  He was compliant with home exercise program.    Pain: 0/10  Location: right knee      Objective      Knee Active Range of Motion 0-95 during session and no extensor lag on concentric phase but 5 degree lag during eccentric phase    Treatment     Nolan received the treatments listed below:      therapeutic exercises to develop strength, endurance, ROM, flexibility of RLE for 35  minutes including:  Recumbent bike x 6 min with half revolutions to gently increase flexibility and ROM   Quad sets 3x10 with 3 sec holds   SAQ 3 x 10 with 2 second hold   SLRs 3x10 with derotation for VMO recruitment. With verbal cues for avoiding extensor lag during eccentric phase.   Hip ADD 3x10 with 3 sec holds   Sidelying Hip ABD 3x10  LAQ x 20 with verbal cues for eccentric control   Heel slides 3 x10 (90 degree limit)   Standing hip SLR 3x10 // bars RLE  Standing hip ABD 3x10  // bars RLE  Standing hip Ext 3x10  // bars RLE  SAQs 3x10 with 3 sec holds   Hip extension  prone 3x10   R  ankle all directions with  BTB 3x10 (not today)       (Not This Visit)  manual therapy techniques were applied to the RLE/R knee  for 10 minutes, including:  Patella mobs   HS stretching       Patient Education and Home Exercises       Education provided: yes    Written Home Exercises Provided: yes. Exercises were reviewed and Nolan was able to demonstrate them prior to the end of the session.  Nolan demonstrated good  understanding of the education provided. See Electronic Medical Record under Patient Instructions for exercises provided during therapy sessions    Assessment     Pt tolerated Tx well today to promote increased R knee /R quad strengthening.  PTA provided skilled cues for correct posture/positioning with all exercises.    Nolan Is progressing well towards his goals.   Patient prognosis is Excellent.     Patient will continue to benefit from skilled outpatient physical therapy to address the deficits listed in the problem list box on initial evaluation, provide pt/family education and to maximize pt's level of independence in the home and community environment.     Patient's spiritual, cultural and educational needs considered and pt agreeable to plan of care and goals.     Anticipated barriers to physical therapy: none    Goals:     Short Term Goals: 2 weeks      Pt will increase right knee flexion to 70 degrees, knee extension to 0 degrees, and quad lag to -5 to improve functional mobility.  Pt will tolerate 30 minutes of therapeutic exercise with left knee pain no greater than 4/10 to improve quality of life.  Pt will demonstrate independent performance of home exercise program.     Long Term Goals: 4 weeks   1. Pt will increase right knee flexion to 90 degrees and quad lag to 0 degrees to improve functional mobility and prepare for weight bearing.  2. Pt will increase right knee strength to 3-/5 to allow for weight bearing with gait.  3. Patient will reduce right knee edema by 2.0 cm to improve  functional mobility.    Plan     Pt will continue with Plan of care with progression as appropriate    Cheryl Brewer, PTA, ATP  03/15/2024

## 2024-03-20 ENCOUNTER — CLINICAL SUPPORT (OUTPATIENT)
Dept: REHABILITATION | Facility: HOSPITAL | Age: 24
End: 2024-03-20
Payer: OTHER GOVERNMENT

## 2024-03-20 DIAGNOSIS — S83.211A BUCKET-HANDLE TEAR OF MEDIAL MENISCUS OF RIGHT KNEE AS CURRENT INJURY, INITIAL ENCOUNTER: Primary | ICD-10-CM

## 2024-03-20 PROCEDURE — 97112 NEUROMUSCULAR REEDUCATION: CPT | Mod: CQ

## 2024-03-20 PROCEDURE — 97110 THERAPEUTIC EXERCISES: CPT | Mod: CQ

## 2024-03-20 NOTE — PROGRESS NOTES
[facility]   Physical Therapy Treatment Note      Name: Antonio LYONS Roberto  Clinic Number: [id number]    Therapy Diagnosis:   No diagnosis found.  Physician: Walter Ventura MD    Visit Date: 3/20/2024    Physician Orders: PT Eval and Treat per Dr. Walter Ventura protocol  Medical Diagnosis from Referral: S83.211A (ICD-10-CM) - Bucket handle tear of medial meniscus of right knee  Evaluation Date: 2/23/2024  Authorization Period Expiration: 2/18/25  Plan of Care Expiration: 3/22/24  Plan of care Certification: 2/23/2024 to 3/22/24.  Progress Note Due: 3/22/24  Visit # / Visits authorized: 11/12   FOTO: 56/100     Precautions:  NWB for 4 weeks       Time In: 4:07 pm  Time Out:  4:45 pm  Total Appointment Time (timed & untimed codes): 25 billable minutes       PTA Visit #: 3/5       Subjective     Patient reports: no pain noted today; been compliant with HEP and updated WB status   He was compliant with home exercise program.    Pain: 0/10  Location: right knee      Objective      3/20/2024  Resting ext: -1 degree  Active ext: +2 degrees  Flexion off EOB: not assessed today  Quad lag: none    Treatment     Nolan received the treatments listed below:      therapeutic exercises to develop strength, endurance, ROM, flexibility of RLE for 15  minutes including:  Bike x 5 mins at end of session  Calf Raises off step 2x20 (brace locked into ext)  Hip 3-way - green band 20x each (brace locked into ext)  HS curls with swiss ball 10x10 sec  Prone quad stretch 5x30 sec    manual therapy techniques were applied to the RLE/R knee  for 2 minutes, including:  Patella mobs   Flexion off EOB    Neuromuscular Re-education x 8 minutes  Prone TERMINAL KNEE EXTENSION 10x10 sec  SLR 20x5 sec hold  Long Bridges 10x10 second hold      Patient Education and Home Exercises       Education provided: yes    Written Home Exercises Provided: yes. Exercises were reviewed and Nolan was able to demonstrate them prior to the end  of the session.  Nolan demonstrated good  understanding of the education provided. See Electronic Medical Record under Patient Instructions for exercises provided during therapy sessions    Assessment     No complaints of pain during session. Some superior patellar crepitus noted with prone quad stretch. Extension RANGE OF MOTION is WFL and no quad lag noted today. Flexion is >90 actively so did not force this today as well. Pt tolerated new exercises well with no complaints of pain. Educated pt to keep his brace locked into full extension when ambulating. Educated pt to add new exercises to his HEP as shown today. Will continue to progress as able. Time billed reflects time spent one on one with pt.       Nolan Is progressing well towards his goals.   Patient prognosis is Excellent.     Patient will continue to benefit from skilled outpatient physical therapy to address the deficits listed in the problem list box on initial evaluation, provide pt/family education and to maximize pt's level of independence in the home and community environment.     Patient's spiritual, cultural and educational needs considered and pt agreeable to plan of care and goals.     Anticipated barriers to physical therapy: none    Goals:     Short Term Goals: 2 weeks      Pt will increase right knee flexion to 70 degrees, knee extension to 0 degrees, and quad lag to -5 to improve functional mobility.  Pt will tolerate 30 minutes of therapeutic exercise with left knee pain no greater than 4/10 to improve quality of life.  Pt will demonstrate independent performance of home exercise program.     Long Term Goals: 4 weeks   1. Pt will increase right knee flexion to 90 degrees and quad lag to 0 degrees to improve functional mobility and prepare for weight bearing.  2. Pt will increase right knee strength to 3-/5 to allow for weight bearing with gait.  3. Patient will reduce right knee edema by 2.0 cm to improve functional mobility.    Plan      Pt will continue with Plan of care with progression as appropriate    Shivam Mondragon PTA,  03/20/2024

## 2024-03-26 ENCOUNTER — OFFICE VISIT (OUTPATIENT)
Dept: ORTHOPEDICS | Facility: CLINIC | Age: 24
End: 2024-03-26
Payer: OTHER GOVERNMENT

## 2024-03-26 ENCOUNTER — CLINICAL SUPPORT (OUTPATIENT)
Dept: REHABILITATION | Facility: HOSPITAL | Age: 24
End: 2024-03-26
Payer: OTHER GOVERNMENT

## 2024-03-26 DIAGNOSIS — Z98.890 S/P RIGHT KNEE ARTHROSCOPY: Primary | ICD-10-CM

## 2024-03-26 DIAGNOSIS — S83.211S BUCKET-HANDLE TEAR OF MEDIAL MENISCUS OF RIGHT KNEE AS CURRENT INJURY, SEQUELA: Primary | ICD-10-CM

## 2024-03-26 PROCEDURE — 97110 THERAPEUTIC EXERCISES: CPT

## 2024-03-26 PROCEDURE — 97112 NEUROMUSCULAR REEDUCATION: CPT

## 2024-03-26 PROCEDURE — 99024 POSTOP FOLLOW-UP VISIT: CPT | Mod: ,,, | Performed by: ORTHOPAEDIC SURGERY

## 2024-03-26 PROCEDURE — 97140 MANUAL THERAPY 1/> REGIONS: CPT

## 2024-03-26 PROCEDURE — 99212 OFFICE O/P EST SF 10 MIN: CPT | Mod: PBBFAC | Performed by: ORTHOPAEDIC SURGERY

## 2024-03-26 NOTE — PLAN OF CARE
[facility]   Physical Therapy Updated Plan of Care      Name: Antonio Mejia  Clinic Number: [id number]    Therapy Diagnosis:   No diagnosis found.  Physician: Walter Ventura MD    Visit Date: 3/26/2024    Physician Orders: PT Eval and Treat per Dr. Walter Ventura protocol  Medical Diagnosis from Referral: S83.211A (ICD-10-CM) - Bucket handle tear of medial meniscus of right knee  Evaluation Date: 2/23/2024  Authorization Period Expiration: 3/18 TO 4/30   Plan of Care Expiration: 5/24/24  Plan of care Certification: 3/26/2024 to 5/24/24.  Progress Note Due: 3/22/24  Visit # / Visits authorized: 12/26  FOTO: 56/100     Precautions:  WBAT - Brace locked in Extension      Time In: 10:48 am  Time Out:  11:30 am  Total Appointment Time (timed & untimed codes): 42 billable minutes       PTA Visit #: 0/5       Subjective     Patient reports: saw Dr Watson this morning. MD is pleased with his progress and wants him to continue with Therapy.   He was compliant with home exercise program.    Pain: 0/10  Location: right knee      Objective      3/26/2024  Resting ext: -1 degree  Active ext: +2 degrees  Flexion off EOB: 95 degrees   Quad lag: none    Treatment     Nolan received the treatments listed below:      therapeutic exercises to develop strength, endurance, ROM, flexibility of RLE for 15  minutes including:  Bike x 5 mins at end of session  Calf Raises off step 2x20 (brace locked into ext)  Hip 3-way - green band 20x each (brace locked into ext)  HS curls with swiss ball 10x10 sec  Prone quad stretch 5x30 sec    manual therapy techniques were applied to the RLE/R knee  for 12 minutes, including:  Patella mobs   Flexion off EOB  New Measurements     Neuromuscular Re-education x 15 minutes  Prone TERMINAL KNEE EXTENSION 10x10 sec  SLR 20x5 sec hold  Long Bridges 10x10 second hold      Patient Education and Home Exercises       Education provided: yes    Written Home Exercises Provided: yes.  Exercises were reviewed and Nolan was able to demonstrate them prior to the end of the session.  Nolan demonstrated good  understanding of the education provided. See Electronic Medical Record under Patient Instructions for exercises provided during therapy sessions    Assessment     Patient underwent Right Knee Arthroscopy with Meniscus Repair and Abrasion Arthroplasty Or Microfracture on 2/19/2024 due to a Bucket-handle tear of medial meniscus of right knee. He has been receiving Physical Therapy for 7 weeks and 12 visits. Patient reports he saw Dr Watson this morning. MD is pleased with his progress and wants him to continue with Therapy. Per patient, MD wants the Knee Brace to remain locked in Extension during weight bearing activities. Therefore Plan of Care has been adjusted accordingly. New measurements taken today are as follows :   Resting ext: -1 degree  Active ext: +2 degrees  Flexion off EOB: 95 degrees   Quad lag: none  Updated Plan of Care performed today to extend patients visits with Therapy per MD orders to continue. We will continue to progress him per MD protocol. Sup Visit performed today with MESHA Pickens and MESHA Chacko.  All goals and treatment plan reviewed. Will work toward completion of all goals set.             Nolan Is progressing well towards his goals.   Patient prognosis is Excellent.     Patient will continue to benefit from skilled outpatient physical therapy to address the deficits listed in the problem list box on initial evaluation, provide pt/family education and to maximize pt's level of independence in the home and community environment.     Patient's spiritual, cultural and educational needs considered and pt agreeable to plan of care and goals.     Anticipated barriers to physical therapy: none    Goals:     Short Term Goals: 4 weeks      Pt will increase right knee flexion to 70 degrees, knee extension to 0 degrees, and quad lag to -5 to improve functional  mobility. - Goal Met and Upgraded   Pt will tolerate 30 minutes of therapeutic exercise with left knee pain no greater than 4/10 to improve quality of life. - Goal Ongoing   Pt will demonstrate independent performance of home exercise program. - Goal Ongoing as his Home Exercise Program will be upgraded      Long Term Goals: 8 weeks   1. Pt will increase right knee flexion to 90 degrees and quad lag to 0 degrees to improve functional mobility and prepare for weight bearing. - Goal Met and Upgraded to 120 degrees of Flexion   2. Pt will increase right knee strength to 3-/5 to allow for weight bearing with gait. - Goal Met and upgraded to 4/5   3. Patient will reduce right knee edema by 2.0 cm to improve functional mobility. - Goal Ongoing     Reasons for Recertification of Therapy: MD ordered continued Physical Therapy intervention to progress patient per protocol.     Plan     Updated Certification Period: 3/26/2024 to 5/24/2024  Recommended Treatment Plan: 2 times per week for 8 weeks: Electrical Stimulation to increase strength and decrease pain and inflammation as able, Gait Training, Manual Therapy, Moist Heat/ Ice, Neuromuscular Re-ed, Patient Education, Therapeutic Activities, and Therapeutic Exercise      OLIVIA ANTUNEZ, PT, DPT   3/26/2024

## 2024-03-26 NOTE — PROGRESS NOTES
[facility]   Physical Therapy Treatment Note      Name: Antonio LYONS Roberto  Clinic Number: [id number]    Therapy Diagnosis:   No diagnosis found.  Physician: Walter Ventura MD    Visit Date: 3/26/2024    Physician Orders: PT Eval and Treat per Dr. Walter Ventura protocol  Medical Diagnosis from Referral: S83.211A (ICD-10-CM) - Bucket handle tear of medial meniscus of right knee  Evaluation Date: 2/23/2024  Authorization Period Expiration: 3/18 TO 4/30   Plan of Care Expiration: 5/24/24  Plan of care Certification: 3/26/2024 to 5/24/24.  Progress Note Due: 3/22/24  Visit # / Visits authorized: 12/26  FOTO: 56/100     Precautions:  WBAT - Brace locked in Extension      Time In: 10:48 am  Time Out:  11:30 am  Total Appointment Time (timed & untimed codes): 42 billable minutes       PTA Visit #: 0/5       Subjective     Patient reports: saw Dr Watson this morning. MD is pleased with his progress and wants him to continue with Therapy.   He was compliant with home exercise program.    Pain: 0/10  Location: right knee      Objective      3/26/2024  Resting ext: -1 degree  Active ext: +2 degrees  Flexion off EOB: 95 degrees   Quad lag: none    Treatment     Nolan received the treatments listed below:      therapeutic exercises to develop strength, endurance, ROM, flexibility of RLE for 15  minutes including:  Bike x 5 mins at end of session  Calf Raises off step 2x20 (brace locked into ext)  Hip 3-way - green band 20x each (brace locked into ext)  HS curls with swiss ball 10x10 sec  Prone quad stretch 5x30 sec    manual therapy techniques were applied to the RLE/R knee  for 12 minutes, including:  Patella mobs   Flexion off EOB  New Measurements     Neuromuscular Re-education x 15 minutes  Prone TERMINAL KNEE EXTENSION 10x10 sec  SLR 20x5 sec hold  Long Bridges 10x10 second hold      Patient Education and Home Exercises       Education provided: yes    Written Home Exercises Provided: yes. Exercises  were reviewed and Nolan was able to demonstrate them prior to the end of the session.  Nolan demonstrated good  understanding of the education provided. See Electronic Medical Record under Patient Instructions for exercises provided during therapy sessions    Assessment     Patient underwent Right Knee Arthroscopy with Meniscus Repair and Abrasion Arthroplasty Or Microfracture on 2/19/2024 due to a Bucket-handle tear of medial meniscus of right knee. He has been receiving Physical Therapy for 7 weeks and 12 visits. Patient reports he saw Dr Watson this morning. MD is pleased with his progress and wants him to continue with Therapy. Per patient, MD wants the Knee Brace to remain locked in Extension during weight bearing activities. Therefore Plan of Care has been adjusted accordingly. New measurements taken today are as follows :   Resting ext: -1 degree  Active ext: +2 degrees  Flexion off EOB: 95 degrees   Quad lag: none  Updated Plan of Care performed today to extend patients visits with Therapy per MD orders to continue. We will continue to progress him per MD protocol. Sup Visit performed today with MESHA Pickens and MESHA Chacko.  All goals and treatment plan reviewed. Will work toward completion of all goals set.             Nolan Is progressing well towards his goals.   Patient prognosis is Excellent.     Patient will continue to benefit from skilled outpatient physical therapy to address the deficits listed in the problem list box on initial evaluation, provide pt/family education and to maximize pt's level of independence in the home and community environment.     Patient's spiritual, cultural and educational needs considered and pt agreeable to plan of care and goals.     Anticipated barriers to physical therapy: none    Goals:     Short Term Goals: 4 weeks      Pt will increase right knee flexion to 70 degrees, knee extension to 0 degrees, and quad lag to -5 to improve functional mobility. -  Goal Met and Upgraded   Pt will tolerate 30 minutes of therapeutic exercise with left knee pain no greater than 4/10 to improve quality of life. - Goal Ongoing   Pt will demonstrate independent performance of home exercise program. - Goal Ongoing as his Home Exercise Program will be upgraded      Long Term Goals: 8 weeks   1. Pt will increase right knee flexion to 90 degrees and quad lag to 0 degrees to improve functional mobility and prepare for weight bearing. - Goal Met and Upgraded to 120 degrees of Flexion   2. Pt will increase right knee strength to 3-/5 to allow for weight bearing with gait. - Goal Met and upgraded to 4/5   3. Patient will reduce right knee edema by 2.0 cm to improve functional mobility. - Goal Ongoing     Plan     Updated Certification Period: 3/26/2024 to 5/24/2024  Recommended Treatment Plan: 2 times per week for 8 weeks: Electrical Stimulation to increase strength and decrease pain and inflammation as able, Gait Training, Manual Therapy, Moist Heat/ Ice, Neuromuscular Re-ed, Patient Education, Therapeutic Activities, and Therapeutic Exercise    OLIVIA ANTUNEZ, PT, DPT   03/26/2024

## 2024-03-26 NOTE — PROGRESS NOTES
HISTORY OF PRESENT ILLNESS:       Arthroscopy,knee,with Meniscus Repair - Right and Arthroscopy, Knee, With Abrasion Arthroplasty Or Microfracture - Right 2/19/2024      Pt is here today for Second post-operative followup of his knee arthroscopy.  he is doing well.  We have reviewed his findings and discussed plan of care and future treatment options, including the physical therapy plan.      Patient is 5 weeks post surgery and doing good.                                                                               PHYSICAL EXAMINATION:     Incision sites healed well  No evidence of any erythema, infection or induration  Range of motion 0-120 degrees  Minimal effusion  2+ DP pulse  No swelling, no calf tenderness  - Alejandra's sign  Negative medial joint line tendernes  Moderate quad atrophy                                                                                 ASSESSMENT:                                                                                                                                               1. Status post above, doing well.                                                                                                                               PLAN:                                                                                                                                                     1. Continue with PT  2. Emphasized quad function.  3. I have discussed return to activity in detail.  4. he will see us back in 4 weeks.                                      5. All questions were answered and he should contact us if he  has any questions or concerns in the interim.              There are no Patient Instructions on file for this visit.

## 2024-03-27 ENCOUNTER — CLINICAL SUPPORT (OUTPATIENT)
Dept: REHABILITATION | Facility: HOSPITAL | Age: 24
End: 2024-03-27
Payer: OTHER GOVERNMENT

## 2024-03-27 DIAGNOSIS — S83.211S BUCKET-HANDLE TEAR OF MEDIAL MENISCUS OF RIGHT KNEE AS CURRENT INJURY, SEQUELA: Primary | ICD-10-CM

## 2024-03-27 PROCEDURE — 97110 THERAPEUTIC EXERCISES: CPT | Mod: CQ

## 2024-03-27 PROCEDURE — 97112 NEUROMUSCULAR REEDUCATION: CPT | Mod: CQ

## 2024-03-27 NOTE — PROGRESS NOTES
[facility]   Physical Therapy Treatment Note      Name: Antonio Mejia  Clinic Number: [id number]    Therapy Diagnosis:   No diagnosis found.  Physician: Walter Ventura MD    Visit Date: 3/27/2024    Physician Orders: PT Eval and Treat per Dr. Walter Ventura protocol  Medical Diagnosis from Referral: S83.211A (ICD-10-CM) - Bucket handle tear of medial meniscus of right knee  Evaluation Date: 2/23/2024  Authorization Period Expiration: 3/18 TO 4/30   Plan of Care Expiration: 5/24/24  Plan of care Certification: 3/26/2024 to 5/24/24.  Progress Note Due: 3/22/24  Visit # / Visits authorized: 12/26  FOTO: 56/100     Precautions:  WBAT - Brace locked in Extension      Time In: 4:45 pm  Time Out:  5:23 pm  Total Appointment Time (timed & untimed codes): 23 billable minutes       PTA Visit #: 0/5       Subjective     Patient reports: no pain or soreness   He was compliant with home exercise program.    Pain: 0/10  Location: right knee      Objective      3/26/2024  Resting ext: -1 degree  Active ext: +2 degrees  Flexion off EOB: 95 degrees   Quad lag: none    Treatment     Nolan received the treatments listed below:      therapeutic exercises to develop strength, endurance, ROM, flexibility of RLE for 8  minutes including:  Calf Raises off step 2x fatigue (brace locked into ext)  Hip 3-way - green band 20x each (brace locked into ext)  HS curls with swiss ball 10x10 sec  Prone quad stretch 5x30 sec    manual therapy techniques were applied to the RLE/R knee  for 0 minutes, including:  Patella mobs   Flexion off EOB  New Measurements     Neuromuscular Re-education x 15 minutes  Prone TERMINAL KNEE EXTENSION 10x10 sec  SLR 20x5 sec hold  Long Bridges 10x10 second hold  Planks 10x10 sec  Side planks with hip abd 4x5 (B)  Seated iso SLR with overhead press 20# 3x20 reps       Patient Education and Home Exercises       Education provided: yes    Written Home Exercises Provided: yes. Exercises were  reviewed and Nolan was able to demonstrate them prior to the end of the session.  Nolan demonstrated good  understanding of the education provided. See Electronic Medical Record under Patient Instructions for exercises provided during therapy sessions    Assessment     Pt doing well with no complaints of pain noted. RANGE OF MOTION is doing great and steadily improving. Pt has no lag with SLR. Progressed LOWER EXTREMITY strengthening as tolerated. WB exercises performed with brace locked into full extension. Pt doing great and progressing towards all goals well. Time billed reflects time spent one on one with pt             Nolan Is progressing well towards his goals.   Patient prognosis is Excellent.     Patient will continue to benefit from skilled outpatient physical therapy to address the deficits listed in the problem list box on initial evaluation, provide pt/family education and to maximize pt's level of independence in the home and community environment.     Patient's spiritual, cultural and educational needs considered and pt agreeable to plan of care and goals.     Anticipated barriers to physical therapy: none    Goals:     Short Term Goals: 4 weeks      Pt will increase right knee flexion to 70 degrees, knee extension to 0 degrees, and quad lag to -5 to improve functional mobility. - Goal Met and Upgraded   Pt will tolerate 30 minutes of therapeutic exercise with left knee pain no greater than 4/10 to improve quality of life. - Goal Ongoing   Pt will demonstrate independent performance of home exercise program. - Goal Ongoing as his Home Exercise Program will be upgraded      Long Term Goals: 8 weeks   1. Pt will increase right knee flexion to 90 degrees and quad lag to 0 degrees to improve functional mobility and prepare for weight bearing. - Goal Met and Upgraded to 120 degrees of Flexion   2. Pt will increase right knee strength to 3-/5 to allow for weight bearing with gait. - Goal Met and upgraded to  4/5   3. Patient will reduce right knee edema by 2.0 cm to improve functional mobility. - Goal Ongoing     Plan     Updated Certification Period: 3/26/2024 to 5/24/2024  Recommended Treatment Plan: 2 times per week for 8 weeks: Electrical Stimulation to increase strength and decrease pain and inflammation as able, Gait Training, Manual Therapy, Moist Heat/ Ice, Neuromuscular Re-ed, Patient Education, Therapeutic Activities, and Therapeutic Exercise    Shivam Mondragon, PTA  03/27/2024

## 2024-04-02 ENCOUNTER — CLINICAL SUPPORT (OUTPATIENT)
Dept: REHABILITATION | Facility: HOSPITAL | Age: 24
End: 2024-04-02
Payer: OTHER GOVERNMENT

## 2024-04-02 DIAGNOSIS — S83.211S BUCKET-HANDLE TEAR OF MEDIAL MENISCUS OF RIGHT KNEE AS CURRENT INJURY, SEQUELA: Primary | ICD-10-CM

## 2024-04-02 PROCEDURE — 97112 NEUROMUSCULAR REEDUCATION: CPT | Mod: CQ

## 2024-04-02 PROCEDURE — 97110 THERAPEUTIC EXERCISES: CPT | Mod: CQ

## 2024-04-02 NOTE — PROGRESS NOTES
[facility]   Physical Therapy Treatment Note      Name: Antonio Mejia  Clinic Number: [id number]    Therapy Diagnosis:   No diagnosis found.  Physician: Walter Ventura MD    Visit Date: 4/2/2024    Physician Orders: PT Eval and Treat per Dr. Walter Ventura protocol  Medical Diagnosis from Referral: S83.211A (ICD-10-CM) - Bucket handle tear of medial meniscus of right knee  Evaluation Date: 2/23/2024  Authorization Period Expiration: 3/18 TO 4/30   Plan of Care Expiration: 5/24/24  Plan of care Certification: 3/26/2024 to 5/24/24.  Progress Note Due: 3/22/24  Visit # / Visits authorized: 14/26  FOTO: 56/100     Precautions:  WBAT - Brace locked in Extension      Time In: 4:02 pm  Time Out:  4:40 pm  Total Appointment Time (timed & untimed codes): 23 billable minutes       PTA Visit #: 2/5       Subjective     Patient reports: no pain or soreness; doing well   He was compliant with home exercise program.    Pain: 0/10  Location: right knee      Objective      3/26/2024  Resting ext: -1 degree  Active ext: +2 degrees  Flexion off EOB: 95 degrees   Quad lag: none    Treatment     Nolan received the treatments listed below:      therapeutic exercises to develop strength, endurance, ROM, flexibility of RLE for 8  minutes including:  Bike x 5 mins  Calf Raises off step 4x10  Seated HS Curls 6 plates 30x  Prone quad stretch 5x30 sec    manual therapy techniques were applied to the RLE/R knee  for 0 minutes, including:  Patella mobs   Flexion off EOB  New Measurements     Neuromuscular Re-education x 15 minutes  Long Bridges 10x10 second hold  Planks 10x10 sec  Side planks 5x10 sec  Double Leg Press with rock into TERMINAL KNEE EXTENSION 10 plates 30x  Single Leg Press with rock into TERMINAL KNEE EXTENSION  6 plates 30x  Hip 3 way, 4 plates 20x each (B)      Patient Education and Home Exercises       Education provided: yes    Written Home Exercises Provided: yes. Exercises were reviewed and  Nolan was able to demonstrate them prior to the end of the session.  Nolan demonstrated good  understanding of the education provided. See Electronic Medical Record under Patient Instructions for exercises provided during therapy sessions    Assessment     RANGE OF MOTION is steadily improving. No quad lag with SLR. Progressed strengthening exercises into closed chain without brace with no complaints of pain. Pt a little stiff knee when ambulating without brace on. Educated pt to be diligent with his HEP and to not overdo it at home/gym. Will progress as able. Time billed reflects time spent one on one with pt.             Nolan Is progressing well towards his goals.   Patient prognosis is Excellent.     Patient will continue to benefit from skilled outpatient physical therapy to address the deficits listed in the problem list box on initial evaluation, provide pt/family education and to maximize pt's level of independence in the home and community environment.     Patient's spiritual, cultural and educational needs considered and pt agreeable to plan of care and goals.     Anticipated barriers to physical therapy: none    Goals:     Short Term Goals: 4 weeks      Pt will increase right knee flexion to 70 degrees, knee extension to 0 degrees, and quad lag to -5 to improve functional mobility. - Goal Met and Upgraded   Pt will tolerate 30 minutes of therapeutic exercise with left knee pain no greater than 4/10 to improve quality of life. - Goal Ongoing   Pt will demonstrate independent performance of home exercise program. - Goal Ongoing as his Home Exercise Program will be upgraded      Long Term Goals: 8 weeks   1. Pt will increase right knee flexion to 90 degrees and quad lag to 0 degrees to improve functional mobility and prepare for weight bearing. - Goal Met and Upgraded to 120 degrees of Flexion   2. Pt will increase right knee strength to 3-/5 to allow for weight bearing with gait. - Goal Met and upgraded to  4/5   3. Patient will reduce right knee edema by 2.0 cm to improve functional mobility. - Goal Ongoing     Plan     Updated Certification Period: 3/26/2024 to 5/24/2024  Recommended Treatment Plan: 2 times per week for 8 weeks: Electrical Stimulation to increase strength and decrease pain and inflammation as able, Gait Training, Manual Therapy, Moist Heat/ Ice, Neuromuscular Re-ed, Patient Education, Therapeutic Activities, and Therapeutic Exercise    Shivam Mondragon, KELECHI  04/02/2024

## 2024-04-04 ENCOUNTER — CLINICAL SUPPORT (OUTPATIENT)
Dept: REHABILITATION | Facility: HOSPITAL | Age: 24
End: 2024-04-04
Payer: OTHER GOVERNMENT

## 2024-04-04 DIAGNOSIS — S83.211S BUCKET-HANDLE TEAR OF MEDIAL MENISCUS OF RIGHT KNEE AS CURRENT INJURY, SEQUELA: Primary | ICD-10-CM

## 2024-04-04 PROCEDURE — 97112 NEUROMUSCULAR REEDUCATION: CPT | Mod: CQ

## 2024-04-04 PROCEDURE — 97110 THERAPEUTIC EXERCISES: CPT | Mod: CQ

## 2024-04-04 PROCEDURE — 97530 THERAPEUTIC ACTIVITIES: CPT | Mod: CQ

## 2024-04-04 NOTE — PROGRESS NOTES
[facility]   Physical Therapy Treatment Note      Name: Antonio LYONS Roberto  Clinic Number: [id number]    Therapy Diagnosis:   No diagnosis found.  Physician: Walter Ventura MD    Visit Date: 4/4/2024    Physician Orders: PT Eval and Treat per Dr. Walter Ventura protocol  Medical Diagnosis from Referral: S83.211A (ICD-10-CM) - Bucket handle tear of medial meniscus of right knee  Evaluation Date: 2/23/2024  Authorization Period Expiration: 3/18 TO 4/30   Plan of Care Expiration: 5/24/24  Plan of care Certification: 3/26/2024 to 5/24/24.  Progress Note Due: 3/22/24  Visit # / Visits authorized: 15/26  FOTO: 56/100     Precautions:  WBAT - Brace locked in Extension      Time In: 3:26 pm  Time Out:  4:11 pm  Total Appointment Time (timed & untimed codes): 40 billable minutes     PTA Visit #: 3/5       Subjective     Patient reports: he is sore today   He was compliant with home exercise program.    Pain: 0/10  Location: right knee      Objective      4/4/2024  AROM Flexion: 118 degrees    Treatment     Nolan received the treatments listed below:      therapeutic exercises to develop strength, endurance, ROM, flexibility of RLE for 8  minutes including:  Bike x 5 mins  Seated HS Curls 6 plates 30x  Prone quad stretch 5x30 sec    manual therapy techniques were applied to the RLE/R knee  for 0 minutes, including:  Patella mobs   Flexion off EOB  New Measurements     Neuromuscular Re-education x 20 minutes  Planks 10x10 sec, alternating single leg and double leg  Side planks 5x10 sec  Double Leg Press with rock into TERMINAL KNEE EXTENSION 10 plates 30x  Single Leg Press with rock into TERMINAL KNEE EXTENSION  6 plates 30x  Hip 3 way, 4 plates 20x each (B)      Therapeutic Activities x 12 minutes  Goblet Squats, 25# with slightly staggered stance 30x  Calf Raises, 20# DB, 5x10  Straight leg deadlifts 25# 4x10    Patient Education and Home Exercises       Education provided: yes    Written Home  Exercises Provided: yes. Exercises were reviewed and Nolan was able to demonstrate them prior to the end of the session.  Nolan demonstrated good  understanding of the education provided. See Electronic Medical Record under Patient Instructions for exercises provided during therapy sessions    Assessment     AROM flexion is improving as noted above. Progressing through CKC strengthening with fatigue noted. Pt tolerating addition of exercises well with only complaints of fatigue. Educated pt to be diligent with his HEP and to not overdo it in the gym just yet. Will continue to progress as tolerated. Time billed reflects time spent one on one with pt.       Nolan Is progressing well towards his goals.   Patient prognosis is Excellent.     Patient will continue to benefit from skilled outpatient physical therapy to address the deficits listed in the problem list box on initial evaluation, provide pt/family education and to maximize pt's level of independence in the home and community environment.     Patient's spiritual, cultural and educational needs considered and pt agreeable to plan of care and goals.     Anticipated barriers to physical therapy: none    Goals:     Short Term Goals: 4 weeks      Pt will increase right knee flexion to 70 degrees, knee extension to 0 degrees, and quad lag to -5 to improve functional mobility. - Goal Met and Upgraded   Pt will tolerate 30 minutes of therapeutic exercise with left knee pain no greater than 4/10 to improve quality of life. - Goal Ongoing   Pt will demonstrate independent performance of home exercise program. - Goal Ongoing as his Home Exercise Program will be upgraded      Long Term Goals: 8 weeks   1. Pt will increase right knee flexion to 90 degrees and quad lag to 0 degrees to improve functional mobility and prepare for weight bearing. - Goal Met and Upgraded to 120 degrees of Flexion   2. Pt will increase right knee strength to 3-/5 to allow for weight bearing with  gait. - Goal Met and upgraded to 4/5   3. Patient will reduce right knee edema by 2.0 cm to improve functional mobility. - Goal Ongoing     Plan     Updated Certification Period: 3/26/2024 to 5/24/2024  Recommended Treatment Plan: 2 times per week for 8 weeks: Electrical Stimulation to increase strength and decrease pain and inflammation as able, Gait Training, Manual Therapy, Moist Heat/ Ice, Neuromuscular Re-ed, Patient Education, Therapeutic Activities, and Therapeutic Exercise    Shivam Mondragon, PTA  04/04/2024

## 2024-04-09 ENCOUNTER — CLINICAL SUPPORT (OUTPATIENT)
Dept: REHABILITATION | Facility: HOSPITAL | Age: 24
End: 2024-04-09
Payer: OTHER GOVERNMENT

## 2024-04-09 DIAGNOSIS — S83.211S BUCKET-HANDLE TEAR OF MEDIAL MENISCUS OF RIGHT KNEE AS CURRENT INJURY, SEQUELA: Primary | ICD-10-CM

## 2024-04-09 PROCEDURE — 97110 THERAPEUTIC EXERCISES: CPT | Mod: CQ

## 2024-04-09 PROCEDURE — 97112 NEUROMUSCULAR REEDUCATION: CPT | Mod: CQ

## 2024-04-09 PROCEDURE — 97530 THERAPEUTIC ACTIVITIES: CPT | Mod: CQ

## 2024-04-09 NOTE — PROGRESS NOTES
[facility]   Physical Therapy Treatment Note      Name: Antonio LYONS Roberto  Clinic Number: [id number]    Therapy Diagnosis:   Encounter Diagnosis   Name Primary?    Bucket-handle tear of medial meniscus of right knee as current injury, sequela Yes     Physician: Walter Ventura MD    Visit Date: 4/9/2024    Physician Orders: PT Eval and Treat per Dr. Walter Ventura protocol  Medical Diagnosis from Referral: S83.211A (ICD-10-CM) - Bucket handle tear of medial meniscus of right knee  Evaluation Date: 2/23/2024  Authorization Period Expiration: 3/18 TO 4/30   Plan of Care Expiration: 5/24/24  Plan of care Certification: 3/26/2024 to 5/24/24.  Progress Note Due: 3/22/24  Visit # / Visits authorized: 16/26  FOTO: 56/100     Precautions:  WBAT - Brace locked in Extension      Time In: 10:45 am  Time Out:  11:37 pm  Total Appointment Time (timed & untimed codes): 39 billable minutes     PTA Visit #: 4/5       Subjective     Patient reports:no pain  He was compliant with home exercise program.    Pain: 0/10  Location: right knee      Objective      4/4/2024  AROM Flexion: 118 degrees    Treatment     Nolan received the treatments listed below:      therapeutic exercises to develop strength, endurance, ROM, flexibility of RLE for 8  minutes including:  Bike x 5 mins  Prone HS Curls 2-3 plates 30x  Prone quad stretch 5x30 sec    manual therapy techniques were applied to the RLE/R knee  for 0 minutes, including:  Patella mobs   Flexion off EOB  New Measurements     Neuromuscular Re-education x 12 minutes  Planks 10x10 sec, alternating single leg and double leg  Side planks 5x10 sec  Double Leg Press with rock into TERMINAL KNEE EXTENSION 14-17 plates 3x8  Single Leg Press with rock into TERMINAL KNEE EXTENSION  7 plates 3x8  Hip 3 way, 5 plates 20x each (B)      Therapeutic Activities x 18 minutes  Goblet Squats, 25# with slightly staggered stance 30x  Single Leg Calf raises off step, fatigue set  x2  Straight leg deadlifts 25# 3x10  OKC Ext (0-30 degree) 2 plates 20x (L) and FROM 8 plates (R) 20x (circuit)  Split Squats 20x (B) (circuit)    Patient Education and Home Exercises       Education provided: yes    Written Home Exercises Provided: yes. Exercises were reviewed and Nolan was able to demonstrate them prior to the end of the session.  Nolan demonstrated good  understanding of the education provided. See Electronic Medical Record under Patient Instructions for exercises provided during therapy sessions    Assessment     Pt doing well with no acute complaints of pain today. Extension RANGE OF MOTION is WFL and flexion RANGE OF MOTION is steadily improving. Progressed CKC strengthening with fatigue noted. Initiated light, modified range, OKC strengthening today with only complaints of fatigue. Will continue to progress as able. Educated pt to keep up with his HEP diligently. Time billed reflects time spent one on one with pt.       Nolan Is progressing well towards his goals.   Patient prognosis is Excellent.     Patient will continue to benefit from skilled outpatient physical therapy to address the deficits listed in the problem list box on initial evaluation, provide pt/family education and to maximize pt's level of independence in the home and community environment.     Patient's spiritual, cultural and educational needs considered and pt agreeable to plan of care and goals.     Anticipated barriers to physical therapy: none    Goals:     Short Term Goals: 4 weeks      Pt will increase right knee flexion to 70 degrees, knee extension to 0 degrees, and quad lag to -5 to improve functional mobility. - Goal Met and Upgraded   Pt will tolerate 30 minutes of therapeutic exercise with left knee pain no greater than 4/10 to improve quality of life. - Goal Ongoing   Pt will demonstrate independent performance of home exercise program. - Goal Ongoing as his Home Exercise Program will be upgraded      Long  Term Goals: 8 weeks   1. Pt will increase right knee flexion to 90 degrees and quad lag to 0 degrees to improve functional mobility and prepare for weight bearing. - Goal Met and Upgraded to 120 degrees of Flexion   2. Pt will increase right knee strength to 3-/5 to allow for weight bearing with gait. - Goal Met and upgraded to 4/5   3. Patient will reduce right knee edema by 2.0 cm to improve functional mobility. - Goal Ongoing     Plan     Updated Certification Period: 3/26/2024 to 5/24/2024  Recommended Treatment Plan: 2 times per week for 8 weeks: Electrical Stimulation to increase strength and decrease pain and inflammation as able, Gait Training, Manual Therapy, Moist Heat/ Ice, Neuromuscular Re-ed, Patient Education, Therapeutic Activities, and Therapeutic Exercise    Shivam Mondragon, PTA  04/09/2024

## 2024-04-16 ENCOUNTER — CLINICAL SUPPORT (OUTPATIENT)
Dept: REHABILITATION | Facility: HOSPITAL | Age: 24
End: 2024-04-16
Payer: OTHER GOVERNMENT

## 2024-04-16 DIAGNOSIS — S83.211S BUCKET-HANDLE TEAR OF MEDIAL MENISCUS OF RIGHT KNEE AS CURRENT INJURY, SEQUELA: Primary | ICD-10-CM

## 2024-04-16 PROCEDURE — 97112 NEUROMUSCULAR REEDUCATION: CPT

## 2024-04-16 PROCEDURE — 97530 THERAPEUTIC ACTIVITIES: CPT | Mod: CQ

## 2024-04-16 NOTE — PROGRESS NOTES
[facility]   Physical Therapy Treatment Note      Name: Antonio LYONS Roberto  Clinic Number: [id number]    Therapy Diagnosis:   No diagnosis found.    Physician: Walter Ventura MD    Visit Date: 4/16/2024    Physician Orders: PT Eval and Treat per Dr. Walter Ventura protocol  Medical Diagnosis from Referral: S83.211A (ICD-10-CM) - Bucket handle tear of medial meniscus of right knee  Evaluation Date: 2/23/2024  Authorization Period Expiration: 3/18 TO 4/30   Plan of Care Expiration: 5/24/24  Plan of care Certification: 3/26/2024 to 5/24/24.  Progress Note Due: 3/22/24  Visit # / Visits authorized: 17/26  FOTO: 56/100     Precautions:  WBAT - Brace locked in Extension      Time In: 3:40 pm (Xiomara Villaseñor, PT, DPT)  Time Out: 3:55 pm  (Xiomara Villaseñor, PT, DPT)    Time In: 3:56 Shivam Mondragon PTA   Time Out:  4:40 pm Shivam Mondragon PTA   Total Appointment Time (timed & untimed codes): 35 billable minutes     PTA Visit #: 5/5       Subjective     Patient reports:no pain  He was compliant with home exercise program.    Pain: 0/10  Location: right knee      Objective      4/4/2024  AROM Flexion: 118 degrees    Treatment     Nolan received the treatments listed below:      therapeutic exercises to develop strength, endurance, ROM, flexibility of RLE for 3  minutes including:  Bike x 5 mins  Prone HS Curls 3 plates 20x  Prone quad stretch 5x30 sec    manual therapy techniques were applied to the RLE/R knee  for 0 minutes, including:  Patella mobs   Flexion off EOB    Neuromuscular Re-education x 15 minutes  Planks 10x10 sec, alternating single leg and double leg  Side planks 5x10 sec  Double Leg Press with rock into TERMINAL KNEE EXTENSION 16 plates 3x8  Single Leg Press with rock into TERMINAL KNEE EXTENSION  7 plates 3x8  TBDL 165# 3x8      Therapeutic Activities x 16 minutes  Goblet Squats, 30# with slightly staggered stance 30x  Single Leg Calf raises off step, fatigue set x2  Straight leg deadlifts 25#  3x10  OKC Ext (0-30 degree) 2 plates 20x (L) and FROM 8 plates (R) 20x (circuit)  Split Squats 20x (B) (circuit)    Patient Education and Home Exercises       Education provided: yes    Written Home Exercises Provided: yes. Exercises were reviewed and Nolan was able to demonstrate them prior to the end of the session.  Nolan demonstrated good  understanding of the education provided. See Electronic Medical Record under Patient Instructions for exercises provided during therapy sessions    Assessment     Supervisory visit performed by Xiomara Villaseñor, PT, DPT at start of session and exercises marked in red text. Progressed LOWER EXTREMITY strengthening with only complaints of muscular fatigue. Able to add in TBDL with no pain in his knee. Pt does favor his R LOWER EXTREMITY so performed BOSU squats for some proprioceptive feedback. Pt tolerated session well with only complaints of fatigue. Time billed reflects time spent one on one with pt.       Nolan Is progressing well towards his goals.   Patient prognosis is Excellent.     Patient will continue to benefit from skilled outpatient physical therapy to address the deficits listed in the problem list box on initial evaluation, provide pt/family education and to maximize pt's level of independence in the home and community environment.     Patient's spiritual, cultural and educational needs considered and pt agreeable to plan of care and goals.     Anticipated barriers to physical therapy: none    Goals:     Short Term Goals: 4 weeks      Pt will increase right knee flexion to 70 degrees, knee extension to 0 degrees, and quad lag to -5 to improve functional mobility. - Goal Met and Upgraded   Pt will tolerate 30 minutes of therapeutic exercise with left knee pain no greater than 4/10 to improve quality of life. - Goal Ongoing   Pt will demonstrate independent performance of home exercise program. - Goal Ongoing as his Home Exercise Program will be upgraded      Long Term  Goals: 8 weeks   1. Pt will increase right knee flexion to 90 degrees and quad lag to 0 degrees to improve functional mobility and prepare for weight bearing. - Goal Met and Upgraded to 120 degrees of Flexion   2. Pt will increase right knee strength to 3-/5 to allow for weight bearing with gait. - Goal Met and upgraded to 4/5   3. Patient will reduce right knee edema by 2.0 cm to improve functional mobility. - Goal Ongoing     Plan     Updated Certification Period: 3/26/2024 to 5/24/2024  Recommended Treatment Plan: 2 times per week for 8 weeks: Electrical Stimulation to increase strength and decrease pain and inflammation as able, Gait Training, Manual Therapy, Moist Heat/ Ice, Neuromuscular Re-ed, Patient Education, Therapeutic Activities, and Therapeutic Exercise    Shivam Mondragon, PTA  04/16/2024

## 2024-04-17 NOTE — PLAN OF CARE
[facility]   Physical Therapy Treatment Note/Supervisory Visit      Name: Antonio Mejia  Clinic Number: [id number]    Therapy Diagnosis:   No diagnosis found.    Physician: Walter Ventura MD    Visit Date: 4/16/2024    Physician Orders: PT Eval and Treat per Dr. Walter Ventura protocol  Medical Diagnosis from Referral: S83.211A (ICD-10-CM) - Bucket handle tear of medial meniscus of right knee  Evaluation Date: 2/23/2024  Authorization Period Expiration: 3/18 TO 4/30   Plan of Care Expiration: 5/24/24  Plan of care Certification: 3/26/2024 to 5/24/24.  Progress Note Due: 3/22/24  Visit # / Visits authorized: 17/26  FOTO: 56/100     Precautions:  WBAT - Brace locked in Extension      Time In: 3:40 pm (Xiomara Villaseñor, PT, DPT)  Time Out: 3:55 pm  (Xiomara Villaseñor, PT, DPT)    Time In: 3:56 Shivam Mondragon PTA   Time Out:  4:40 pm Shivam Mondragon PTA   Total Appointment Time (timed & untimed codes): 35 billable minutes     PTA Visit #: 5/5       Subjective     Patient reports:no pain  He was compliant with home exercise program.    Pain: 0/10  Location: right knee      Objective      4/4/2024  AROM Flexion: 118 degrees    Treatment     Nolan received the treatments listed below:      therapeutic exercises to develop strength, endurance, ROM, flexibility of RLE for 3  minutes including:  Bike x 5 mins  Prone HS Curls 3 plates 20x  Prone quad stretch 5x30 sec    manual therapy techniques were applied to the RLE/R knee  for 0 minutes, including:  Patella mobs   Flexion off EOB    Neuromuscular Re-education x 15 minutes  Planks 10x10 sec, alternating single leg and double leg  Side planks 5x10 sec  Double Leg Press with rock into TERMINAL KNEE EXTENSION 16 plates 3x8  Single Leg Press with rock into TERMINAL KNEE EXTENSION  7 plates 3x8  TBDL 165# 3x8      Therapeutic Activities x 16 minutes  Goblet Squats, 30# with slightly staggered stance 30x  Single Leg Calf raises off step, fatigue set x2  Straight  leg deadlifts 25# 3x10  OKC Ext (0-30 degree) 2 plates 20x (L) and FROM 8 plates (R) 20x (circuit)  Split Squats 20x (B) (circuit)    Patient Education and Home Exercises       Education provided: yes    Written Home Exercises Provided: yes. Exercises were reviewed and Nolan was able to demonstrate them prior to the end of the session.  Nolan demonstrated good  understanding of the education provided. See Electronic Medical Record under Patient Instructions for exercises provided during therapy sessions    Assessment     Patient reports he is doing well today. Knee is feeling good. The only real discomfort he has right now is after sitting in his chair at work for extended periods of time. He reports once he stands up and walks around for a few minutes it feels fine. His strength is progressing well. We plan to continue to progress him per MD protocol. Sup Visit performed today with MESHA Pickens and MESHA Chacko.  All goals and treatment plan reviewed. Will work toward completion of all goals set.  Supervisory visit performed by Xiomara Villaseñor, PT, DPT at start of session and exercises marked in red text.           Nolan Is progressing well towards his goals.   Patient prognosis is Excellent.     Patient will continue to benefit from skilled outpatient physical therapy to address the deficits listed in the problem list box on initial evaluation, provide pt/family education and to maximize pt's level of independence in the home and community environment.     Patient's spiritual, cultural and educational needs considered and pt agreeable to plan of care and goals.     Anticipated barriers to physical therapy: none    Goals:     Short Term Goals: 4 weeks      Pt will increase right knee flexion to 70 degrees, knee extension to 0 degrees, and quad lag to -5 to improve functional mobility. - Goal Met and Upgraded   Pt will tolerate 30 minutes of therapeutic exercise with left knee pain no greater than 4/10 to  improve quality of life. - Goal Ongoing   Pt will demonstrate independent performance of home exercise program. - Goal Ongoing as his Home Exercise Program will be upgraded      Long Term Goals: 8 weeks   1. Pt will increase right knee flexion to 90 degrees and quad lag to 0 degrees to improve functional mobility and prepare for weight bearing. - Goal Met and Upgraded to 120 degrees of Flexion   2. Pt will increase right knee strength to 3-/5 to allow for weight bearing with gait. - Goal Met and upgraded to 4/5   3. Patient will reduce right knee edema by 2.0 cm to improve functional mobility. - Goal Ongoing     Plan     Updated Certification Period: 3/26/2024 to 5/24/2024  Recommended Treatment Plan: 2 times per week for 8 weeks: Electrical Stimulation to increase strength and decrease pain and inflammation as able, Gait Training, Manual Therapy, Moist Heat/ Ice, Neuromuscular Re-ed, Patient Education, Therapeutic Activities, and Therapeutic Exercise    OLIVIA ANTUNEZ, PT, DPT  04/16/2024

## 2024-04-18 ENCOUNTER — CLINICAL SUPPORT (OUTPATIENT)
Dept: REHABILITATION | Facility: HOSPITAL | Age: 24
End: 2024-04-18
Payer: OTHER GOVERNMENT

## 2024-04-18 DIAGNOSIS — S83.211S BUCKET-HANDLE TEAR OF MEDIAL MENISCUS OF RIGHT KNEE AS CURRENT INJURY, SEQUELA: Primary | ICD-10-CM

## 2024-04-18 PROCEDURE — 97530 THERAPEUTIC ACTIVITIES: CPT | Mod: CQ

## 2024-04-18 PROCEDURE — 97112 NEUROMUSCULAR REEDUCATION: CPT | Mod: CQ

## 2024-04-18 NOTE — PROGRESS NOTES
"[facility]   Physical Therapy Treatment Note      Name: Antonio LYONS Roberto  Clinic Number: [id number]    Therapy Diagnosis:   Encounter Diagnosis   Name Primary?    Bucket-handle tear of medial meniscus of right knee as current injury, sequela Yes       Physician: Walter Ventura MD    Visit Date: 4/18/2024    Physician Orders: PT Eval and Treat per Dr. Walter Ventura protocol  Medical Diagnosis from Referral: S83.211A (ICD-10-CM) - Bucket handle tear of medial meniscus of right knee  Evaluation Date: 2/23/2024  Authorization Period Expiration: 3/18 TO 4/30   Plan of Care Expiration: 5/24/24  Plan of care Certification: 3/26/2024 to 5/24/24.  Progress Note Due: 3/22/24  Visit # / Visits authorized: 18/26  FOTO: 56/100     Precautions:  WBAT - Brace locked in Extension     Time In: 10:45 am   Time Out:  11:36 pm   Total Appointment Time (timed & untimed codes): 27 billable minutes     PTA Visit #: 1/5       Subjective     Patient reports:no pain & no soreness noted   He was compliant with home exercise program.    Pain: 0/10  Location: right knee      Objective      4/4/2024  AROM Flexion: 118 degrees    Treatment     Nolan received the treatments listed below:      therapeutic exercises to develop strength, endurance, ROM, flexibility of RLE for 4  minutes including:  Bike x 5 mins  Double Leg HS Curls 8 plates 20x  Standing quad stretch 5x30 sec    manual therapy techniques were applied to the RLE/R knee  for 0 minutes, including:  Patella mobs   Flexion off EOB    Neuromuscular Re-education x 8 minutes  Double Leg Press with rock into TERMINAL KNEE EXTENSION 16 plates 3x8  Single Leg Press with rock into TERMINAL KNEE EXTENSION  7 plates 3x8  TBDL 165# 3x8  Lateral Step Downs 8" 2x10    Therapeutic Activities x 15 minutes  Goblet Squats, 30# with slightly staggered stance 30x  Single Leg Calf raises off step, fatigue set x2  Straight leg deadlifts 25# 3x10  OKC Ext (0-30 degree) 2 plates 20x " "(L) and FROM 8 plates (R) 20x (circuit)  Split Squats 20x (B) (circuit)  18" Step Ups with Rev Lunge 15x (B)    Patient Education and Home Exercises       Education provided: yes    Written Home Exercises Provided: yes. Exercises were reviewed and Nolan was able to demonstrate them prior to the end of the session.  Nolan demonstrated good  understanding of the education provided. See Electronic Medical Record under Patient Instructions for exercises provided during therapy sessions    Assessment     Pt doing well with no complaints of pain during exercises. Still tight at end range knee flexion so worked on this today via standing quad stretch. Progressed LOWER EXTREMITY strengthening with fatigue noted. No pain with FROM OKC exercises. Will progress as able. Time billed reflects time spent one on one with pt.       Nolan Is progressing well towards his goals.   Patient prognosis is Excellent.     Patient will continue to benefit from skilled outpatient physical therapy to address the deficits listed in the problem list box on initial evaluation, provide pt/family education and to maximize pt's level of independence in the home and community environment.     Patient's spiritual, cultural and educational needs considered and pt agreeable to plan of care and goals.     Anticipated barriers to physical therapy: none    Goals:     Short Term Goals: 4 weeks      Pt will increase right knee flexion to 70 degrees, knee extension to 0 degrees, and quad lag to -5 to improve functional mobility. - Goal Met and Upgraded   Pt will tolerate 30 minutes of therapeutic exercise with left knee pain no greater than 4/10 to improve quality of life. - Goal Ongoing   Pt will demonstrate independent performance of home exercise program. - Goal Ongoing as his Home Exercise Program will be upgraded      Long Term Goals: 8 weeks   1. Pt will increase right knee flexion to 90 degrees and quad lag to 0 degrees to improve functional mobility " and prepare for weight bearing. - Goal Met and Upgraded to 120 degrees of Flexion   2. Pt will increase right knee strength to 3-/5 to allow for weight bearing with gait. - Goal Met and upgraded to 4/5   3. Patient will reduce right knee edema by 2.0 cm to improve functional mobility. - Goal Ongoing     Plan     Updated Certification Period: 3/26/2024 to 5/24/2024  Recommended Treatment Plan: 2 times per week for 8 weeks: Electrical Stimulation to increase strength and decrease pain and inflammation as able, Gait Training, Manual Therapy, Moist Heat/ Ice, Neuromuscular Re-ed, Patient Education, Therapeutic Activities, and Therapeutic Exercise    Shivam Mondragon, PTA  04/18/2024

## 2024-04-23 ENCOUNTER — OFFICE VISIT (OUTPATIENT)
Dept: ORTHOPEDICS | Facility: CLINIC | Age: 24
End: 2024-04-23
Payer: OTHER GOVERNMENT

## 2024-04-23 ENCOUNTER — CLINICAL SUPPORT (OUTPATIENT)
Dept: REHABILITATION | Facility: HOSPITAL | Age: 24
End: 2024-04-23
Payer: OTHER GOVERNMENT

## 2024-04-23 DIAGNOSIS — S83.211A BUCKET-HANDLE TEAR OF MEDIAL MENISCUS OF RIGHT KNEE AS CURRENT INJURY, INITIAL ENCOUNTER: Primary | ICD-10-CM

## 2024-04-23 DIAGNOSIS — S83.211A BUCKET-HANDLE TEAR OF MEDIAL MENISCUS OF RIGHT KNEE AS CURRENT INJURY, INITIAL ENCOUNTER: ICD-10-CM

## 2024-04-23 DIAGNOSIS — Z98.890 S/P RIGHT KNEE ARTHROSCOPY: Primary | ICD-10-CM

## 2024-04-23 PROCEDURE — 99212 OFFICE O/P EST SF 10 MIN: CPT | Mod: PBBFAC | Performed by: ORTHOPAEDIC SURGERY

## 2024-04-23 PROCEDURE — 97110 THERAPEUTIC EXERCISES: CPT | Mod: CQ

## 2024-04-23 PROCEDURE — 97530 THERAPEUTIC ACTIVITIES: CPT | Mod: CQ

## 2024-04-23 PROCEDURE — 97112 NEUROMUSCULAR REEDUCATION: CPT | Mod: CQ

## 2024-04-23 PROCEDURE — 99024 POSTOP FOLLOW-UP VISIT: CPT | Mod: ,,, | Performed by: ORTHOPAEDIC SURGERY

## 2024-04-23 NOTE — PROGRESS NOTES
"[facility]   Physical Therapy Treatment Note      Name: Antonio LYONS Roberto  Clinic Number: [id number]    Therapy Diagnosis:   No diagnosis found.      Physician: Walter Ventura MD    Visit Date: 4/23/2024    Physician Orders: PT Eval and Treat per Dr. Walter Ventura protocol  Medical Diagnosis from Referral: S83.211A (ICD-10-CM) - Bucket handle tear of medial meniscus of right knee  Evaluation Date: 2/23/2024  Authorization Period Expiration: 3/18 TO 4/30   Plan of Care Expiration: 5/24/24  Plan of care Certification: 3/26/2024 to 5/24/24.  Progress Note Due: 3/22/24  Visit # / Visits authorized: 19/26  FOTO: 56/100     Precautions:  WBAT - Brace locked in Extension     Time In: 10:03 am   Time Out: 10:41 am  Total Appointment Time (timed & untimed codes): 38 billable minutes     PTA Visit #: 2/5     Subjective     Patient reports:feeling ok this morning. Needs to leave therapy a little early due to seeing Dr. Watson at 10:45. no pain & no soreness noted.   He was compliant with home exercise program.    Pain: 0/10  Location: right knee      Objective      4/4/2024  AROM Flexion: 118 degrees    Treatment     Nolan received the treatments listed below:      therapeutic exercises to develop strength, endurance, ROM, flexibility of RLE for 14 minutes including:    Elliptical  x 5 minutes   SB stretch 3 x 20 second hold   Double Leg HS Curls 8 plates 20x  Standing quad stretch 5x30 sec    manual therapy techniques were applied to the RLE/R knee  for 0 minutes, including:  Patella mobs   Flexion off EOB    Neuromuscular Re-education x 10 minutes    Double Leg Press with rock into TERMINAL KNEE EXTENSION 16 plates 3x8  Single Leg Press with rock into TERMINAL KNEE EXTENSION  7 plates 3x8  TBDL 165# 3x8  Lateral Step Downs 8" 2x10 NTV    Therapeutic Activities x 14 minutes  Goblet Squats, 30# with slightly staggered stance 30x  Rearfoot elevated squats x 20 30#  Single Leg Calf raises off step, fatigue " "set x2  Straight leg deadlifts 25# 3x10 NTV  OKC Ext (0-30 degree) 2 plates 20x (L) and FROM 8 plates (R) 20x (circuit)  Split Squats 20x (B) (circuit)  18" Step Ups with Rev Lunge 15x (B) NTV    Patient Education and Home Exercises       Education provided: yes    Written Home Exercises Provided: yes. Exercises were reviewed and Nolan was able to demonstrate them prior to the end of the session.  Nolan demonstrated good  understanding of the education provided. See Electronic Medical Record under Patient Instructions for exercises provided during therapy sessions    Assessment     Patient arrived with reports of feeling ok this morning. He needs to leave therapy a little early due to seeing Dr. Watson at 10:45. He has no reports of pain and only soreness noted. He is currently 9 weeks post op status as of 4/22. Per MD protocol, straight line running can be initiated at week 10. Focused on quad stretch in standing to further decrease stiffness into knee flexion.  Progressed LOWER EXTREMITY strengthening with fatigue noted from addition of rearfoot elevated goblet squats. No pain with FROM OKC exercises. Will progress as able when Patient returns.       Nolan Is progressing well towards his goals.   Patient prognosis is Excellent.     Patient will continue to benefit from skilled outpatient physical therapy to address the deficits listed in the problem list box on initial evaluation, provide pt/family education and to maximize pt's level of independence in the home and community environment.     Patient's spiritual, cultural and educational needs considered and pt agreeable to plan of care and goals.     Anticipated barriers to physical therapy: none    Goals:     Short Term Goals: 4 weeks      Pt will increase right knee flexion to 70 degrees, knee extension to 0 degrees, and quad lag to -5 to improve functional mobility. - Goal Met and Upgraded   Pt will tolerate 30 minutes of therapeutic exercise with left knee pain " no greater than 4/10 to improve quality of life. - Goal Ongoing   Pt will demonstrate independent performance of home exercise program. - Goal Ongoing as his Home Exercise Program will be upgraded      Long Term Goals: 8 weeks   1. Pt will increase right knee flexion to 90 degrees and quad lag to 0 degrees to improve functional mobility and prepare for weight bearing. - Goal Met and Upgraded to 120 degrees of Flexion   2. Pt will increase right knee strength to 3-/5 to allow for weight bearing with gait. - Goal Met and upgraded to 4/5   3. Patient will reduce right knee edema by 2.0 cm to improve functional mobility. - Goal Ongoing     Plan     Updated Certification Period: 3/26/2024 to 5/24/2024  Recommended Treatment Plan: 2 times per week for 8 weeks: Electrical Stimulation to increase strength and decrease pain and inflammation as able, Gait Training, Manual Therapy, Moist Heat/ Ice, Neuromuscular Re-ed, Patient Education, Therapeutic Activities, and Therapeutic Exercise    Jonh De Leon, PTA  04/23/2024

## 2024-04-23 NOTE — PROGRESS NOTES
HISTORY OF PRESENT ILLNESS:       Arthroscopy,knee,with Meniscus Repair - Right and Arthroscopy, Knee, With Abrasion Arthroplasty Or Microfracture - Right 2/19/2024      Pt is here today for Third post-operative followup of his knee arthroscopy.  he is doing well.  We have reviewed his findings and discussed plan of care and future treatment options, including the physical therapy plan.      Patient is 9 weeks post surgery.    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                                                                               PHYSICAL EXAMINATION:     Incision sites healed well  No evidence of any erythema, infection or induration  Range of motion 0-120 degrees  Minimal effusion  2+ DP pulse  No swelling, no calf tenderness  - Alejandra's sign  Negative medial joint line tendernes  Moderate quad atrophy                                                                                 ASSESSMENT:                                                                                                                                               1. Status post above, doing well.                                                                                                                               PLAN:                                                                                                                                                     1. Continue with PT  2. Emphasized quad function.  3. I have discussed return to activity in detail.  4. he will see us back in 6 weeks.     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  Not yet ready for return to running.  Will likely take 6 months before ready for full  duty.          Aiming for  a return to full duty in July 2024.                         5. All questions were answered and he should contact us if he  has any questions or concerns in the interim.              There are no Patient Instructions on file for this visit.

## 2024-04-25 ENCOUNTER — CLINICAL SUPPORT (OUTPATIENT)
Dept: REHABILITATION | Facility: HOSPITAL | Age: 24
End: 2024-04-25
Payer: OTHER GOVERNMENT

## 2024-04-25 DIAGNOSIS — S83.211S BUCKET-HANDLE TEAR OF MEDIAL MENISCUS OF RIGHT KNEE AS CURRENT INJURY, SEQUELA: Primary | ICD-10-CM

## 2024-04-25 PROCEDURE — 97530 THERAPEUTIC ACTIVITIES: CPT | Mod: CQ

## 2024-04-25 PROCEDURE — 97110 THERAPEUTIC EXERCISES: CPT | Mod: CQ

## 2024-04-25 PROCEDURE — 97112 NEUROMUSCULAR REEDUCATION: CPT | Mod: CQ

## 2024-04-25 NOTE — PROGRESS NOTES
"[facility]   Physical Therapy Treatment Note      Name: Antonio LYONS Roberto  Clinic Number: [id number]    Therapy Diagnosis:   No diagnosis found.      Physician: Walter Ventura MD    Visit Date: 4/25/2024    Physician Orders: PT Eval and Treat per Dr. Walter Ventura protocol  Medical Diagnosis from Referral: S83.211A (ICD-10-CM) - Bucket handle tear of medial meniscus of right knee  Evaluation Date: 2/23/2024  Authorization Period Expiration: 3/18 TO 4/30   Plan of Care Expiration: 5/24/24  Plan of care Certification: 3/26/2024 to 5/24/24.  Progress Note Due: 3/22/24  Visit # / Visits authorized: 20/26  FOTO: 56/100     Precautions:  WBAT - Brace locked in Extension     Time In: 3:35 pm  Time Out: 4:32 pm  Total Appointment Time (timed & untimed codes): 57 billable minutes     PTA Visit #: 3/5     Subjective     Patient reports: no running until July per MD. 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  He was compliant with home exercise program.    Pain: 0/10  Location: right knee      Objective      4/4/2024  AROM Flexion: 118 degrees    Treatment     Nolan received the treatments listed below:      therapeutic exercises to develop strength, endurance, ROM, flexibility of RLE for 14 minutes including:    Elliptical  x 5 minutes   SB stretch 3 x 20 second hold   Double Leg HS Curls 8 plates 20x  Standing quad stretch 5x30 sec    manual therapy techniques were applied to the RLE/R knee for 0 minutes, including:  Patella mobs   Flexion off EOB    Neuromuscular Re-education x 15 minutes    Double Leg Press with rock into TERMINAL KNEE EXTENSION 16 plates 3x8  Single Leg Press with rock into TERMINAL KNEE EXTENSION  8 plates 3x8  TBDL 165# 3x8  Lateral Step Downs 8" 2x10     Therapeutic Activities x 24 minutes    Goblet Squats, 30# with slightly staggered stance 30x  Rearfoot elevated squats 3 x 8 30#  Single Leg Calf raises off step, fatigue set x 2 " "NTV  Straight leg deadlifts 25# 3x10   OKC Ext (0-30 degree) 2 plates 20x (L) and FROM 8 plates (R) 20x (circuit)  Split Squats 20x (B) (circuit)  18" Step Ups with Rev Lunge 15x (B)     Supervised modalities after being cleared for contraindications: vasopneumatic compression at medium pressure, coldest setting x 10 minutes to right knee to decrease edema and soreness.    Patient Education and Home Exercises       Education provided: yes    Written Home Exercises Provided: yes. Exercises were reviewed and Nolan was able to demonstrate them prior to the end of the session.  Nolan demonstrated good  understanding of the education provided. See Electronic Medical Record under Patient Instructions for exercises provided during therapy sessions    Assessment     Patient arrived with reports of seeing MD on Tuesday. No running until July per MD. 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 Mild acute soreness reported today in the knee.  He is currently 9 weeks post op status as of 4/22. Progressed LOWER EXTREMITY strengthening with fatigue noted from quad strengthening exercises.  No pain with FROM OKCE exercises. Ended with Game[employer] to decrease pain and inflammation in the knee due to Patient being up on his feet more this week and mild edema present. Will progress as able when Patient returns.       Nolan Is progressing well towards his goals.   Patient prognosis is Excellent.     Patient will continue to benefit from skilled outpatient physical therapy to address the deficits listed in the problem list box on initial evaluation, provide pt/family education and to maximize pt's level of independence in the home and community environment.     Patient's spiritual, cultural and educational needs considered and pt agreeable to plan of care and goals.     Anticipated barriers to physical therapy: none    Goals:     Short Term Goals: 4 weeks      Pt will increase right knee flexion " to 70 degrees, knee extension to 0 degrees, and quad lag to -5 to improve functional mobility. - Goal Met and Upgraded   Pt will tolerate 30 minutes of therapeutic exercise with left knee pain no greater than 4/10 to improve quality of life. - Goal Ongoing   Pt will demonstrate independent performance of home exercise program. - Goal Ongoing as his Home Exercise Program will be upgraded      Long Term Goals: 8 weeks   1. Pt will increase right knee flexion to 90 degrees and quad lag to 0 degrees to improve functional mobility and prepare for weight bearing. - Goal Met and Upgraded to 120 degrees of Flexion   2. Pt will increase right knee strength to 3-/5 to allow for weight bearing with gait. - Goal Met and upgraded to 4/5   3. Patient will reduce right knee edema by 2.0 cm to improve functional mobility. - Goal Ongoing     Plan     Updated Certification Period: 3/26/2024 to 5/24/2024  Recommended Treatment Plan: 2 times per week for 8 weeks: Electrical Stimulation to increase strength and decrease pain and inflammation as able, Gait Training, Manual Therapy, Moist Heat/ Ice, Neuromuscular Re-ed, Patient Education, Therapeutic Activities, and Therapeutic Exercise    Jonh De Leon, KELECHI  04/25/2024

## 2024-04-26 ENCOUNTER — OFFICE VISIT (OUTPATIENT)
Dept: FAMILY MEDICINE | Facility: CLINIC | Age: 24
End: 2024-04-26
Payer: OTHER GOVERNMENT

## 2024-04-26 VITALS
WEIGHT: 218.63 LBS | HEART RATE: 76 BPM | HEIGHT: 70 IN | SYSTOLIC BLOOD PRESSURE: 131 MMHG | OXYGEN SATURATION: 100 % | TEMPERATURE: 98 F | DIASTOLIC BLOOD PRESSURE: 79 MMHG | BODY MASS INDEX: 31.3 KG/M2 | RESPIRATION RATE: 20 BRPM

## 2024-04-26 DIAGNOSIS — R53.83 FATIGUE, UNSPECIFIED TYPE: Primary | ICD-10-CM

## 2024-04-26 LAB
ALBUMIN SERPL BCP-MCNC: 4.3 G/DL (ref 3.5–5)
ALBUMIN/GLOB SERPL: 1.2 {RATIO}
ALP SERPL-CCNC: 63 U/L (ref 45–115)
ALT SERPL W P-5'-P-CCNC: 31 U/L (ref 16–61)
ANION GAP SERPL CALCULATED.3IONS-SCNC: 12 MMOL/L (ref 7–16)
AST SERPL W P-5'-P-CCNC: 20 U/L (ref 15–37)
BASOPHILS # BLD AUTO: 0.06 K/UL (ref 0–0.2)
BASOPHILS NFR BLD AUTO: 0.9 % (ref 0–1)
BILIRUB SERPL-MCNC: 0.6 MG/DL (ref ?–1.2)
BUN SERPL-MCNC: 11 MG/DL (ref 7–18)
BUN/CREAT SERPL: 10 (ref 6–20)
CALCIUM SERPL-MCNC: 9.5 MG/DL (ref 8.5–10.1)
CHLORIDE SERPL-SCNC: 106 MMOL/L (ref 98–107)
CO2 SERPL-SCNC: 26 MMOL/L (ref 21–32)
CREAT SERPL-MCNC: 1.15 MG/DL (ref 0.7–1.3)
DIFFERENTIAL METHOD BLD: ABNORMAL
EGFR (NO RACE VARIABLE) (RUSH/TITUS): 92 ML/MIN/1.73M2
EOSINOPHIL # BLD AUTO: 0.26 K/UL (ref 0–0.5)
EOSINOPHIL NFR BLD AUTO: 3.9 % (ref 1–4)
ERYTHROCYTE [DISTWIDTH] IN BLOOD BY AUTOMATED COUNT: 12.9 % (ref 11.5–14.5)
GLOBULIN SER-MCNC: 3.5 G/DL (ref 2–4)
GLUCOSE SERPL-MCNC: 82 MG/DL (ref 74–106)
HCT VFR BLD AUTO: 46 % (ref 40–54)
HGB BLD-MCNC: 15.2 G/DL (ref 13.5–18)
IMM GRANULOCYTES # BLD AUTO: 0.03 K/UL (ref 0–0.04)
IMM GRANULOCYTES NFR BLD: 0.4 % (ref 0–0.4)
LYMPHOCYTES # BLD AUTO: 2.13 K/UL (ref 1–4.8)
LYMPHOCYTES NFR BLD AUTO: 31.8 % (ref 27–41)
MCH RBC QN AUTO: 30.3 PG (ref 27–31)
MCHC RBC AUTO-ENTMCNC: 33 G/DL (ref 32–36)
MCV RBC AUTO: 91.8 FL (ref 80–96)
MONOCYTES # BLD AUTO: 0.61 K/UL (ref 0–0.8)
MONOCYTES NFR BLD AUTO: 9.1 % (ref 2–6)
MPC BLD CALC-MCNC: 8.7 FL (ref 9.4–12.4)
NEUTROPHILS # BLD AUTO: 3.61 K/UL (ref 1.8–7.7)
NEUTROPHILS NFR BLD AUTO: 53.9 % (ref 53–65)
NRBC # BLD AUTO: 0 X10E3/UL
NRBC, AUTO (.00): 0 %
PLATELET # BLD AUTO: 405 K/UL (ref 150–400)
POTASSIUM SERPL-SCNC: 3.9 MMOL/L (ref 3.5–5.1)
PROT SERPL-MCNC: 7.8 G/DL (ref 6.4–8.2)
RBC # BLD AUTO: 5.01 M/UL (ref 4.6–6.2)
SODIUM SERPL-SCNC: 140 MMOL/L (ref 136–145)
TESTOST SERPL-MCNC: 424 NG/DL (ref 240–950)
TSH SERPL DL<=0.005 MIU/L-ACNC: 1.56 UIU/ML (ref 0.36–3.74)
WBC # BLD AUTO: 6.7 K/UL (ref 4.5–11)

## 2024-04-26 PROCEDURE — 99213 OFFICE O/P EST LOW 20 MIN: CPT | Mod: ,,,

## 2024-04-26 PROCEDURE — 84403 ASSAY OF TOTAL TESTOSTERONE: CPT | Mod: ,,, | Performed by: CLINICAL MEDICAL LABORATORY

## 2024-04-26 PROCEDURE — 80050 GENERAL HEALTH PANEL: CPT | Mod: ,,, | Performed by: CLINICAL MEDICAL LABORATORY

## 2024-04-26 RX ORDER — IBUPROFEN 400 MG/1
400 TABLET ORAL 3 TIMES DAILY
COMMUNITY
Start: 2023-10-30

## 2024-04-26 NOTE — ASSESSMENT & PLAN NOTE
Will get labs today.   CMP, CBC, TSH, Testerone level.   Will call with lab results Monday and start plan after that.

## 2024-04-26 NOTE — PROGRESS NOTES
HPI:   Antonio Mejia is a pleasant 24 y.o. patient who reports to clinic with complaints of fatigue, not a lot of energy since February. He states that the end of February he started being tired more. He states he used to work out at least three times a day starting at 5:30 in the am. He states then he would go home and play with his son, but now he is not having a lot of energy to even do that. He states he has just been more tired than lately. He has not worked out other than twice a week since February, and he states that isn't like him.  He would dalia his Testerone level checked. He had a knee surgery in Feb and he was released to go to the gym in March but he is to tired now. 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 He had never had a testrone level drawn to see if it was low at that time. Before that deployment he was active. He also admits to no sex drive. He denies any chest pain or shortness of breath. He denies any other issues at this time. 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                 Past Medical History:   Diagnosis Date    ADHD (attention deficit hyperactivity disorder)        PAST SURGICAL HISTORY:   Past Surgical History:   Procedure Laterality Date    ARTHROSCOPY, KNEE, WITH ABRASION ARTHROPLASTY OR MICROFRACTURE Right 2/19/2024    Procedure: ARTHROSCOPY, KNEE, WITH ABRASION ARTHROPLASTY OR MICROFRACTURE;  Surgeon: Walter Ventura MD;  Location: [facility];  Service: Orthopedics;  Laterality: Right;    ARTHROSCOPY,KNEE,WITH MENISCUS REPAIR Right 2/19/2024    Procedure: ARTHROSCOPY,KNEE,WITH MENISCUS REPAIR;  Surgeon: Walter Venutra MD;  Location: [facility] OR;  Service: Orthopedics;  Laterality: Right;    HAND SURGERY      TONSILLECTOMY         MEDICATIONS:    Current Outpatient Medications:     ibuprofen (ADVIL,MOTRIN) 400 MG tablet, Take 400 mg by mouth  3 (three) times daily., Disp: , Rfl:     celecoxib (CELEBREX) 200 MG capsule, Take 1 capsule (200 mg total) by mouth 2 (two) times daily. (Patient not taking: Reported on 4/26/2024), Disp: 60 capsule, Rfl: 2    fluticasone propionate (FLONASE) 50 mcg/actuation nasal spray, 2 sprays (100 mcg total) by Each Nostril route once daily. (Patient not taking: Reported on 4/26/2024), Disp: 16 g, Rfl: 0    ondansetron (ZOFRAN-ODT) 4 MG TbDL, Take 1 tablet (4 mg total) by mouth every 6 (six) hours as needed (nausea). (Patient not taking: Reported on 4/26/2024), Disp: 30 tablet, Rfl: 0    oxyCODONE-acetaminophen (PERCOCET)  mg per tablet, Take 1 tablet by mouth every 6 (six) hours as needed for Pain. (Patient not taking: Reported on 4/26/2024), Disp: 30 tablet, Rfl: 0    ALLERGIES:   Review of patient's allergies indicates:  No Known Allergies      Review of Systems   Constitutional:  Positive for activity change and fatigue. Negative for chills and fever.   HENT: Negative.  Negative for drooling and nosebleeds.    Eyes: Negative.    Respiratory: Negative.  Negative for chest tightness and shortness of breath.    Cardiovascular: Negative.  Negative for chest pain and palpitations.   Gastrointestinal: Negative.    Endocrine: Negative.    Genitourinary: Negative.  Negative for difficulty urinating.   Musculoskeletal: Negative.    Integumentary:  Negative.   Allergic/Immunologic: Negative.    Neurological: Negative.  Negative for dizziness.   Hematological: Negative.    Psychiatric/Behavioral: Negative.     All other systems reviewed and are negative.         Physical Exam  Constitutional:       General: He is not in acute distress.     Appearance: Normal appearance. He is well-developed. He is not ill-appearing.   HENT:      Head: Normocephalic and atraumatic.      Right Ear: Tympanic membrane normal.      Left Ear: Tympanic membrane normal.      Nose: Nose normal.      Mouth/Throat:      Mouth: Mucous membranes are moist.  "     Pharynx: Oropharynx is clear. No posterior oropharyngeal erythema.   Cardiovascular:      Rate and Rhythm: Normal rate and regular rhythm.      Pulses: Normal pulses.      Heart sounds: Normal heart sounds.   Pulmonary:      Effort: Pulmonary effort is normal. No accessory muscle usage or respiratory distress.      Breath sounds: Normal breath sounds.   Abdominal:      General: Abdomen is flat. Bowel sounds are normal. There is no distension.      Palpations: Abdomen is soft.      Tenderness: There is no abdominal tenderness.   Musculoskeletal:         General: Normal range of motion.      Cervical back: Normal range of motion.   Skin:     General: Skin is warm and dry.      Capillary Refill: Capillary refill takes less than 2 seconds.   Neurological:      Mental Status: He is alert and oriented to person, place, and time. Mental status is at baseline.   Psychiatric:         Mood and Affect: Mood normal.         Speech: Speech normal.         Behavior: Behavior normal. Behavior is cooperative.         Thought Content: Thought content normal.          VITAL SIGNS:   /79 (BP Location: Right arm, Patient Position: Sitting, BP Method: Large (Automatic))   Pulse 76   Temp 97.8 °F (36.6 °C) (Oral)   Resp 20   Ht 5' 10" (1.778 m)   Wt 99.2 kg (218 lb 9.6 oz)   SpO2 100%   BMI 31.37 kg/m²       ASSESSMENT/PLAN  1. Fatigue, unspecified type  Assessment & Plan:  Will get labs today.   CMP, CBC, TSH, Testerone level.   Will call with lab results Monday and start plan after that.    Orders:  -     CBC Auto Differential; Future; Expected date: 04/26/2024  -     Comprehensive Metabolic Panel; Future; Expected date: 04/26/2024  -     TSH; Future; Expected date: 04/26/2024  -     Testosterone; Future; Expected date: 04/26/2024             Patient Instructions   Follow up with the clinic as needed   Will call with lab Monday.     Orders Placed This Encounter   Procedures    CBC Auto Differential     Standing " Status:   Future     Number of Occurrences:   1     Standing Expiration Date:   7/25/2025    Comprehensive Metabolic Panel     Standing Status:   Future     Number of Occurrences:   1     Standing Expiration Date:   7/25/2025    TSH     Standing Status:   Future     Number of Occurrences:   1     Standing Expiration Date:   7/25/2025    Testosterone     Standing Status:   Future     Number of Occurrences:   1     Standing Expiration Date:   7/25/2025    CBC with Differential

## 2024-05-06 PROBLEM — J01.00 ACUTE NON-RECURRENT MAXILLARY SINUSITIS: Status: RESOLVED | Noted: 2024-02-05 | Resolved: 2024-05-06

## 2024-05-07 ENCOUNTER — CLINICAL SUPPORT (OUTPATIENT)
Dept: REHABILITATION | Facility: HOSPITAL | Age: 24
End: 2024-05-07
Payer: OTHER GOVERNMENT

## 2024-05-07 DIAGNOSIS — S83.211S BUCKET-HANDLE TEAR OF MEDIAL MENISCUS OF RIGHT KNEE AS CURRENT INJURY, SEQUELA: Primary | ICD-10-CM

## 2024-05-07 PROCEDURE — 97112 NEUROMUSCULAR REEDUCATION: CPT | Mod: CQ

## 2024-05-07 PROCEDURE — 97110 THERAPEUTIC EXERCISES: CPT | Mod: CQ

## 2024-05-07 NOTE — PROGRESS NOTES
[facility]   Physical Therapy Treatment Note      Name: Antonio LYONS Roberto  Clinic Number: [id number]    Therapy Diagnosis:   No diagnosis found.      Physician: Walter Ventura MD    Visit Date: 5/7/2024    Physician Orders: PT Eval and Treat per Dr. Walter Ventura protocol  Medical Diagnosis from Referral: S83.211A (ICD-10-CM) - Bucket handle tear of medial meniscus of right knee  Evaluation Date: 2/23/2024  Authorization Period Expiration: 3/18 TO 4/30   Plan of Care Expiration: 5/24/24  Plan of care Certification: 3/26/2024 to 5/24/24.  Progress Note Due: 3/22/24  Visit # / Visits authorized: 20/26  FOTO: 56/100     Precautions:  WBAT - Brace locked in Extension     Time In: 4:33 pm  Time Out: 5:21 pm  Total Appointment Time (timed & untimed codes): 30 billable minutes     PTA Visit #: 3/5     Subjective     Patient reports: no running until July per MD. 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  He was compliant with home exercise program.    Pain: 0/10  Location: right knee      Objective      4/4/2024  AROM Flexion: 118 degrees    Treatment     Nolan received the treatments listed below:      therapeutic exercises to develop strength, endurance, ROM, flexibility of RLE for 1 minutes including:    Bike  x 5 minutes   SB stretch 3 x 20 second hold   Standing quad stretch 5x30 sec    manual therapy techniques were applied to the RLE/R knee for 0 minutes, including:  Patella mobs   Flexion off EOB    Neuromuscular Re-education x 13 minutes    Double Leg Press with rock into TERMINAL KNEE EXTENSION 17 plates 3x8  Single Leg Press with rock into TERMINAL KNEE EXTENSION  10 plates 3x8  TBDL 225# 3x8  BOSU SLS 4x20 sec hold    Therapeutic Activities x 16 minutes    Goblet Squats, 45# with slightly staggered stance 3x8  Rearfoot elevated squats 3 x 8 30#  Straight leg deadlifts 35# 3x10   OKC Ext (0-30 degree) 3 plates 20x (L) and FROM 4 plates (R) 20x  ()  Split Squats 20x (B) ()      Supervised modalities after being cleared for contraindications: vasopneumatic compression at medium pressure, coldest setting x 0 minutes to right knee to decrease edema and soreness.    Patient Education and Home Exercises       Education provided: yes    Written Home Exercises Provided: yes. Exercises were reviewed and Nolan was able to demonstrate them prior to the end of the session.  Nolan demonstrated good  understanding of the education provided. See Electronic Medical Record under Patient Instructions for exercises provided during therapy sessions    Assessment     Pt doing well and his strength is steadily improving. [facility] strength is still a little behind. Pt tolerating progression of gym exercises well and is making good progress towards goals. Will initiate light double leg/single leg plyometrics next session as tolerated. Educated pt to keep up with HEP diligently. Time billed reflects time spent one on one with pt.       Nolan Is progressing well towards his goals.   Patient prognosis is Excellent.     Patient will continue to benefit from skilled outpatient physical therapy to address the deficits listed in the problem list box on initial evaluation, provide pt/family education and to maximize pt's level of independence in the home and community environment.     Patient's spiritual, cultural and educational needs considered and pt agreeable to plan of care and goals.     Anticipated barriers to physical therapy: none    Goals:     Short Term Goals: 4 weeks      Pt will increase right knee flexion to 70 degrees, knee extension to 0 degrees, and quad lag to -5 to improve functional mobility. - Goal Met and Upgraded   Pt will tolerate 30 minutes of therapeutic exercise with left knee pain no greater than 4/10 to improve quality of life. - Goal Ongoing   Pt will demonstrate independent performance of home exercise program. - Goal Ongoing as his Home Exercise Program will be  upgraded      Long Term Goals: 8 weeks   1. Pt will increase right knee flexion to 90 degrees and quad lag to 0 degrees to improve functional mobility and prepare for weight bearing. - Goal Met and Upgraded to 120 degrees of Flexion   2. Pt will increase right knee strength to 3-/5 to allow for weight bearing with gait. - Goal Met and upgraded to 4/5   3. Patient will reduce right knee edema by 2.0 cm to improve functional mobility. - Goal Ongoing     Plan     Updated Certification Period: 3/26/2024 to 5/24/2024  Recommended Treatment Plan: 2 times per week for 8 weeks: Electrical Stimulation to increase strength and decrease pain and inflammation as able, Gait Training, Manual Therapy, Moist Heat/ Ice, Neuromuscular Re-ed, Patient Education, Therapeutic Activities, and Therapeutic Exercise    Shivam Mondragon, PTA  05/07/2024

## 2024-05-09 ENCOUNTER — CLINICAL SUPPORT (OUTPATIENT)
Dept: REHABILITATION | Facility: HOSPITAL | Age: 24
End: 2024-05-09
Payer: OTHER GOVERNMENT

## 2024-05-09 DIAGNOSIS — S83.211S BUCKET-HANDLE TEAR OF MEDIAL MENISCUS OF RIGHT KNEE AS CURRENT INJURY, SEQUELA: Primary | ICD-10-CM

## 2024-05-09 PROCEDURE — 97530 THERAPEUTIC ACTIVITIES: CPT | Mod: CQ

## 2024-05-09 PROCEDURE — 97110 THERAPEUTIC EXERCISES: CPT | Mod: CQ

## 2024-05-09 NOTE — PROGRESS NOTES
"[facility]   Physical Therapy Treatment Note      Name: Antonio LYONS Roberto  Clinic Number: [id number]    Therapy Diagnosis:   Encounter Diagnosis   Name Primary?    Bucket-handle tear of medial meniscus of right knee as current injury, sequela Yes         Physician: Walter Ventura MD    Visit Date: 5/9/2024    Physician Orders: PT Eval and Treat per Dr. Walter Ventura protocol  Medical Diagnosis from Referral: S83.211A (ICD-10-CM) - Bucket handle tear of medial meniscus of right knee  Evaluation Date: 2/23/2024  Authorization Period Expiration: 3/18 TO 4/30   Plan of Care Expiration: 5/24/24  Plan of care Certification: 3/26/2024 to 5/24/24.  Progress Note Due: 3/22/24  Visit # / Visits authorized: 20/26  FOTO: 56/100     Precautions:  WBAT - Brace locked in Extension     Time In: 3:45 pm  Time Out: 4:28 pm  Total Appointment Time (timed & untimed codes): 44 minutes     PTA Visit #: 4/5     Subjective     Patient reports: no pain/soreness noted  He was compliant with home exercise program.    Pain: 0/10  Location: right knee      Objective      4/4/2024  AROM Flexion: 118 degrees    Treatment     Nolan received the treatments listed below:      therapeutic exercises to develop strength, endurance, ROM, flexibility of RLE for 8 minutes including:    Bike  x 5 minutes   SB stretch 3 x 20 second hold   Standing quad stretch 5x30 sec    manual therapy techniques were applied to the RLE/R knee for 0 minutes, including:  Patella mobs   Flexion off EOB    Neuromuscular Re-education x 0 minutes    Double Leg Press with rock into TERMINAL KNEE EXTENSION 17 plates 3x8  Single Leg Press with rock into TERMINAL KNEE EXTENSION  10 plates 3x8  TBDL 225# 3x8  BOSU SLS 4x20 sec hold    Therapeutic Activities x 30 minutes  Walking knee hugs 1 lap  Walking Quad pulls 1 lap  Fwd Skips 2 laps  Box Jumps 8"   Step off&stick - 8" 20x  Band Assisted DL Hops 20x  DL Jump from Seated Position, 18", 20x  SL Band " "Assisted Hops, 20x  SL Jump from Seated Position, 24", 20x   Band Assisted DL pogos 3x15  Band Assisted SL pogos 3x15      Supervised modalities after being cleared for contraindications: vasopneumatic compression at medium pressure, coldest setting x 0 minutes to right knee to decrease edema and soreness.    Patient Education and Home Exercises       Education provided: yes    Written Home Exercises Provided: yes. Exercises were reviewed and Nolan was able to demonstrate them prior to the end of the session.  Nolan demonstrated good  understanding of the education provided. See Electronic Medical Record under Patient Instructions for exercises provided during therapy sessions    Assessment     Initiated plyometrics today with no complaints of pain. Started with double leg plyos and able to progress to single leg plyos. Pt demonstrates good form after initial inhibition to plyometrics. Pt tolerated session well. Educated pt to keep up with his HEP and gym exercises within pain limits. Will progress plyos as tolerated and return to jogging as tolerated.      Nolan Is progressing well towards his goals.   Patient prognosis is Excellent.     Patient will continue to benefit from skilled outpatient physical therapy to address the deficits listed in the problem list box on initial evaluation, provide pt/family education and to maximize pt's level of independence in the home and community environment.     Patient's spiritual, cultural and educational needs considered and pt agreeable to plan of care and goals.     Anticipated barriers to physical therapy: none    Goals:     Short Term Goals: 4 weeks      Pt will increase right knee flexion to 70 degrees, knee extension to 0 degrees, and quad lag to -5 to improve functional mobility. - Goal Met and Upgraded   Pt will tolerate 30 minutes of therapeutic exercise with left knee pain no greater than 4/10 to improve quality of life. - Goal Ongoing   Pt will demonstrate " independent performance of home exercise program. - Goal Ongoing as his Home Exercise Program will be upgraded      Long Term Goals: 8 weeks   1. Pt will increase right knee flexion to 90 degrees and quad lag to 0 degrees to improve functional mobility and prepare for weight bearing. - Goal Met and Upgraded to 120 degrees of Flexion   2. Pt will increase right knee strength to 3-/5 to allow for weight bearing with gait. - Goal Met and upgraded to 4/5   3. Patient will reduce right knee edema by 2.0 cm to improve functional mobility. - Goal Ongoing     Plan     Updated Certification Period: 3/26/2024 to 5/24/2024  Recommended Treatment Plan: 2 times per week for 8 weeks: Electrical Stimulation to increase strength and decrease pain and inflammation as able, Gait Training, Manual Therapy, Moist Heat/ Ice, Neuromuscular Re-ed, Patient Education, Therapeutic Activities, and Therapeutic Exercise    Shivam Mondragon, PTA  05/09/2024

## 2024-05-15 ENCOUNTER — CLINICAL SUPPORT (OUTPATIENT)
Dept: REHABILITATION | Facility: HOSPITAL | Age: 24
End: 2024-05-15
Payer: OTHER GOVERNMENT

## 2024-05-15 DIAGNOSIS — S83.211S BUCKET-HANDLE TEAR OF MEDIAL MENISCUS OF RIGHT KNEE AS CURRENT INJURY, SEQUELA: Primary | ICD-10-CM

## 2024-05-15 PROCEDURE — 97530 THERAPEUTIC ACTIVITIES: CPT | Mod: CQ

## 2024-05-15 PROCEDURE — 97112 NEUROMUSCULAR REEDUCATION: CPT | Mod: CQ

## 2024-05-15 NOTE — PROGRESS NOTES
"[facility]   Physical Therapy Treatment Note      Name: Antonio LYONS Roberto  Clinic Number: [id number]    Therapy Diagnosis:   Encounter Diagnosis   Name Primary?    Bucket-handle tear of medial meniscus of right knee as current injury, sequela Yes         Physician: Walter Ventura MD    Visit Date: 5/15/2024    Physician Orders: PT Eval and Treat per Dr. Walter Ventura protocol  Medical Diagnosis from Referral: S83.211A (ICD-10-CM) - Bucket handle tear of medial meniscus of right knee  Evaluation Date: 2/23/2024  Authorization Period Expiration: 3/18 TO 4/30   Plan of Care Expiration: 5/24/24  Plan of care Certification: 3/26/2024 to 5/24/24.  Progress Note Due: 3/22/24  Visit # / Visits authorized: 20/26  FOTO: 56/100     Precautions:  WBAT - Brace locked in Extension     Time In: 3:55 pm  Time Out: 4:50 pm  Total Appointment Time (timed & untimed codes): 45 billable minutes     PTA Visit #: 4/5     Subjective     Patient reports: no pain/soreness noted;   He was compliant with home exercise program.    Pain: 0/10  Location: right knee      Objective      4/4/2024  AROM Flexion: 118 degrees    Treatment     Nolan received the treatments listed below:      therapeutic exercises to develop strength, endurance, ROM, flexibility of RLE for 5 minutes including:    Bike  x 5 minutes   SB stretch 3 x 20 second hold   Standing quad stretch 5x30 sec    manual therapy techniques were applied to the RLE/R knee for 0 minutes, including:  Patella mobs   Flexion off EOB    Neuromuscular Re-education x 10 minutes    Double Leg Press with rock into TERMINAL KNEE EXTENSION 17 plates 3x8  Single Leg Press with rock into TERMINAL KNEE EXTENSION  10 plates 3x8  TBDL 225# 3x8  Hack Squats into CR 3x10 100#  SL RDL 35# 3x10    Therapeutic Activities x 30 minutes  Walking knee hugs 1 lap  Walking Quad pulls 1 lap  Box Jumps 18" 20x  Treadmill Interval Jogging, 15 minutes      Supervised modalities after being " cleared for contraindications: vasopneumatic compression at medium pressure, coldest setting x 0 minutes to right knee to decrease edema and soreness.    Patient Education and Home Exercises       Education provided: yes    Written Home Exercises Provided: yes. Exercises were reviewed and Nolan was able to demonstrate them prior to the end of the session.  Nolan demonstrated good  understanding of the education provided. See Electronic Medical Record under Patient Instructions for exercises provided during therapy sessions    Assessment     Pt tolerated progression into jogging today with no complaints of pain. Pt has some crepitus in his knee but no reports of pain with this with jogging or box jumps. Pt has good form with plyos. Will plan to discharge to [facility] next week as pt is back in the gym regularly with no issues. Time billed reflects time spent one on one with pt.       Nolan Is progressing well towards his goals.   Patient prognosis is Excellent.     Patient will continue to benefit from skilled outpatient physical therapy to address the deficits listed in the problem list box on initial evaluation, provide pt/family education and to maximize pt's level of independence in the home and community environment.     Patient's spiritual, cultural and educational needs considered and pt agreeable to plan of care and goals.     Anticipated barriers to physical therapy: none    Goals:     Short Term Goals: 4 weeks      Pt will increase right knee flexion to 70 degrees, knee extension to 0 degrees, and quad lag to -5 to improve functional mobility. - Goal Met and Upgraded   Pt will tolerate 30 minutes of therapeutic exercise with left knee pain no greater than 4/10 to improve quality of life. - Goal Ongoing   Pt will demonstrate independent performance of home exercise program. - Goal Ongoing as his Home Exercise Program will be upgraded      Long Term Goals: 8 weeks   1. Pt will increase right knee flexion to 90  degrees and quad lag to 0 degrees to improve functional mobility and prepare for weight bearing. - Goal Met and Upgraded to 120 degrees of Flexion   2. Pt will increase right knee strength to 3-/5 to allow for weight bearing with gait. - Goal Met and upgraded to 4/5   3. Patient will reduce right knee edema by 2.0 cm to improve functional mobility. - Goal Ongoing     Plan     Updated Certification Period: 3/26/2024 to 5/24/2024  Recommended Treatment Plan: 2 times per week for 8 weeks: Electrical Stimulation to increase strength and decrease pain and inflammation as able, Gait Training, Manual Therapy, Moist Heat/ Ice, Neuromuscular Re-ed, Patient Education, Therapeutic Activities, and Therapeutic Exercise    Shivam Mondragon, PTA  05/15/2024

## 2024-05-24 ENCOUNTER — CLINICAL SUPPORT (OUTPATIENT)
Dept: REHABILITATION | Facility: HOSPITAL | Age: 24
End: 2024-05-24
Payer: OTHER GOVERNMENT

## 2024-05-24 DIAGNOSIS — S83.211S BUCKET-HANDLE TEAR OF MEDIAL MENISCUS OF RIGHT KNEE AS CURRENT INJURY, SEQUELA: Primary | ICD-10-CM

## 2024-05-24 PROCEDURE — 97530 THERAPEUTIC ACTIVITIES: CPT | Mod: CQ

## 2024-05-24 NOTE — PROGRESS NOTES
[facility]   Physical Therapy Treatment Note      Name: Antonio LYONS Roberto  Clinic Number: [id number]    Therapy Diagnosis:   Encounter Diagnosis   Name Primary?    Bucket-handle tear of medial meniscus of right knee as current injury, sequela Yes     Physician: Walter Ventura MD    Visit Date: 5/24/2024    Physician Orders: PT Eval and Treat per Dr. Walter Ventura protocol  Medical Diagnosis from Referral: S83.211A (ICD-10-CM) - Bucket handle tear of medial meniscus of right knee  Evaluation Date: 2/23/2024  Authorization Period Expiration: 3/18 TO 4/30   Plan of Care Expiration: 5/24/24  Plan of care Certification: 3/26/2024 to 5/24/24.  Progress Note Due: 3/22/24  Visit # / Visits authorized: 24/26  FOTO: 56/100 ; no FOTO on file for DC    Time In: 8:39 am  Time Out: 9:13 am  Total Appointment Time (timed & untimed codes): 30 billable minutes     PTA Visit #: 5/5     Subjective     Patient reports: no pain/soreness noted; jogged 3 miles this AM   He was compliant with home exercise program.    Pain: 0/10  Location: right knee      Objective      5/24/2024  Peak Torque (Affected / Unaffected) Nm/kg (Affected / Unaffected) LSI    60 deg/sec 137.5 Nm / 265.2 Nm 1.38 nm/kg / 2.65 nm/kg 51.8%    180 deg/sec 87.7 Nm / 167.4 Nm  /     300 deg/sec 87.5 Nm / 116.9 Nm  /        NOTE: >2.0 Nm/kg recommended for return to sport; only tested LSI at 60 deg/sec for accurate strength reflection.    Treatment     Nolan received the treatments listed below:      therapeutic exercises to develop strength, endurance, ROM, flexibility of RLE for 5 minutes including:    Elliptical  x 5 minutes   SB stretch 3 x 20 second hold   Standing quad stretch 5x30 sec    manual therapy techniques were applied to the RLE/R knee for 0 minutes, including:  Patella mobs   Flexion off EOB    Neuromuscular Re-education x 0 minutes    Double Leg Press with rock into TERMINAL KNEE EXTENSION 17 plates 3x8  Single Leg Press with  rock into TERMINAL KNEE EXTENSION  10 plates 3x8  TBDL 225# 3x8  Hack Squats into CR 3x10 100#  SL RDL 35# 3x10    Therapeutic Activities x 25 minutes  Biodex Test, x 25 minutes      Supervised modalities after being cleared for contraindications: vasopneumatic compression at medium pressure, coldest setting x 0 minutes to right knee to decrease edema and soreness.    Patient Education and Home Exercises       Education provided: yes    Written Home Exercises Provided: yes. Exercises were reviewed and Nolan was able to demonstrate them prior to the end of the session.  Nolan demonstrated good  understanding of the education provided. See Electronic Medical Record under Patient Instructions for exercises provided during therapy sessions    Assessment     Biodex test performed today to get an accurate objective measurement of pt's quad/hamstring strength/ratio. Results of test noted above in objective section. Pt demonstrates 48% deficit in quad strength at 60 deg/sec. Hamstring strength is > unaffected leg. Pt has met all of his established PT goals. Plan is to DC to HEP/gym exercises today. Educated pt to really focus on his gym routine and focusing single limb strengthening.      Nolan Is progressing well towards his goals.   Patient prognosis is Excellent.     Patient will continue to benefit from skilled outpatient physical therapy to address the deficits listed in the problem list box on initial evaluation, provide pt/family education and to maximize pt's level of independence in the home and community environment.     Patient's spiritual, cultural and educational needs considered and pt agreeable to plan of care and goals.     Anticipated barriers to physical therapy: none      Short Term Goals: 4 weeks      Pt will increase right knee flexion to 70 degrees, knee extension to 0 degrees, and quad lag to -5 to improve functional mobility. - Goal Met  Pt will tolerate 30 minutes of therapeutic exercise with left  knee pain no greater than 4/10 to improve quality of life. - Goal Met   Pt will demonstrate independent performance of home exercise program. - Goal Met     Long Term Goals: 8 weeks   1. Pt will increase right knee flexion to 90 degrees and quad lag to 0 degrees to improve functional mobility and prepare for weight bearing. - Goal Met   2. Pt will increase right knee strength to 3-/5 to allow for weight bearing with gait. - Goal Met    3. Patient will reduce right knee edema by 2.0 cm to improve functional mobility. - Goal Met     Plan     Patient to be Discharged from Physical Therapy services following today's treatment. See Discharge Summary if needed.       Shivam Mondragon, PTA  05/24/2024

## 2024-06-04 ENCOUNTER — OFFICE VISIT (OUTPATIENT)
Dept: ORTHOPEDICS | Facility: CLINIC | Age: 24
End: 2024-06-04
Payer: OTHER GOVERNMENT

## 2024-06-04 DIAGNOSIS — Z98.890 S/P RIGHT KNEE ARTHROSCOPY: Primary | ICD-10-CM

## 2024-06-04 PROCEDURE — 99213 OFFICE O/P EST LOW 20 MIN: CPT | Mod: S$PBB,,, | Performed by: ORTHOPAEDIC SURGERY

## 2024-06-04 PROCEDURE — 99212 OFFICE O/P EST SF 10 MIN: CPT | Mod: PBBFAC | Performed by: ORTHOPAEDIC SURGERY

## 2024-06-04 NOTE — PROGRESS NOTES
HISTORY OF PRESENT ILLNESS:       Arthroscopy,knee,with Meniscus Repair - Right and Arthroscopy, Knee, With Abrasion Arthroplasty Or Microfracture - Right 2/19/2024      Pt is here today for Third post-operative followup of his knee arthroscopy.  he is doing well and has resumed most of his normal activities. He has no complaints today.      We have reviewed his findings and discussed plan of care and future treatment options, including the physical therapy plan.                                                                                     PHYSICAL EXAMINATION:     Incision sites healed well  No evidence of any erythema, infection or induration  Range of motion 0-120 degrees  Minimal effusion  2+ DP pulse  No swelling, no calf tenderness  - Alejandra's sign  Negative medial joint line tendernes  Moderate quad atrophy                                                                                 ASSESSMENT:                                                                                                                                               1. Status post above, doing well.                                                                                                                               PLAN:                                                                                                                                                     1. Continue with PT  2. Emphasized quad function.  He is doing well.  He is already doing jujitsu exercises he is back to running he is progress nightly nicely.  Okay to increase his activity as tolerated overall is now close to 100% will follow up as needed.            There are no Patient Instructions on file for this visit.

## 2024-06-04 NOTE — LETTER
June 4, 2024      [facility]  [address] [place] [id number]  Phone: [phone]  Fax: [phone]       Patient: Antonio Mejia   YOB: 2000  Date of Visit: 06/04/2024    To Whom It May Concern:    Neeru Mejia  was at [facility] on 06/04/2024. The patient may return to work/school on 06/04/2024 with no restrictions. If you have any questions or concerns, or if I can be of further assistance, please do not hesitate to contact me.    Sincerely,    Dr Walter Ventura

## 2024-06-05 NOTE — PLAN OF CARE
[facility]   Physical Therapy Discharge Summary     Name: Antonio LYONS Roberto  Clinic Number: [id number]    Therapy Diagnosis:   Encounter Diagnosis   Name Primary?    Bucket-handle tear of medial meniscus of right knee as current injury, sequela Yes     Physician: Walter Ventura MD    Visit Date: 5/24/2024    Physician Orders: PT Eval and Treat per Dr. Walter Ventura protocol  Medical Diagnosis from Referral: S83.211A (ICD-10-CM) - Bucket handle tear of medial meniscus of right knee  Evaluation Date: 2/23/2024  Authorization Period Expiration: 3/18 TO 4/30   Plan of Care Expiration: 5/24/24  Plan of care Certification: 3/26/2024 to 5/24/24.  Progress Note Due: 3/22/24  Visit # / Visits authorized: 24/26  FOTO: 56/100 ; no FOTO on file for DC    Time In: 8:39 am  Time Out: 9:13 am  Total Appointment Time (timed & untimed codes): 30 billable minutes     PTA Visit #: 5/5     Subjective     Patient reports: no pain/soreness noted; jogged 3 miles this AM   He was compliant with home exercise program.    Pain: 0/10  Location: right knee      Objective      5/24/2024  Peak Torque (Affected / Unaffected) Nm/kg (Affected / Unaffected) LSI    60 deg/sec 137.5 Nm / 265.2 Nm 1.38 nm/kg / 2.65 nm/kg 51.8%    180 deg/sec 87.7 Nm / 167.4 Nm  /     300 deg/sec 87.5 Nm / 116.9 Nm  /        NOTE: >2.0 Nm/kg recommended for return to sport; only tested LSI at 60 deg/sec for accurate strength reflection.    Treatment     Nolan received the treatments listed below:      therapeutic exercises to develop strength, endurance, ROM, flexibility of RLE for 5 minutes including:    Elliptical  x 5 minutes   SB stretch 3 x 20 second hold   Standing quad stretch 5x30 sec    manual therapy techniques were applied to the RLE/R knee for 0 minutes, including:  Patella mobs   Flexion off EOB    Neuromuscular Re-education x 0 minutes    Double Leg Press with rock into TERMINAL KNEE EXTENSION 17 plates 3x8  Single Leg Press with  rock into TERMINAL KNEE EXTENSION  10 plates 3x8  TBDL 225# 3x8  Hack Squats into CR 3x10 100#  SL RDL 35# 3x10    Therapeutic Activities x 25 minutes  Biodex Test, x 25 minutes      Supervised modalities after being cleared for contraindications: vasopneumatic compression at medium pressure, coldest setting x 0 minutes to right knee to decrease edema and soreness.    Patient Education and Home Exercises       Education provided: yes    Written Home Exercises Provided: yes. Exercises were reviewed and Nolan was able to demonstrate them prior to the end of the session.  Nolan demonstrated good  understanding of the education provided. See Electronic Medical Record under Patient Instructions for exercises provided during therapy sessions    Assessment     Biodex test performed today to get an accurate objective measurement of pt's quad/hamstring strength/ratio. Results of test noted above in objective section. Pt demonstrates 48% deficit in quad strength at 60 deg/sec. Hamstring strength is > unaffected leg. Pt has met all of his established PT goals. Plan is to DC to HEP/gym exercises today. Educated pt to really focus on his gym routine and focusing single limb strengthening.      Nolan Is progressing well towards his goals.   Patient prognosis is Excellent.     Patient will continue to benefit from skilled outpatient physical therapy to address the deficits listed in the problem list box on initial evaluation, provide pt/family education and to maximize pt's level of independence in the home and community environment.     Patient's spiritual, cultural and educational needs considered and pt agreeable to plan of care and goals.     Anticipated barriers to physical therapy: none      Short Term Goals: 4 weeks      Pt will increase right knee flexion to 70 degrees, knee extension to 0 degrees, and quad lag to -5 to improve functional mobility. - Goal Met  Pt will tolerate 30 minutes of therapeutic exercise with left  knee pain no greater than 4/10 to improve quality of life. - Goal Met   Pt will demonstrate independent performance of home exercise program. - Goal Met     Long Term Goals: 8 weeks   1. Pt will increase right knee flexion to 90 degrees and quad lag to 0 degrees to improve functional mobility and prepare for weight bearing. - Goal Met   2. Pt will increase right knee strength to 3-/5 to allow for weight bearing with gait. - Goal Met    3. Patient will reduce right knee edema by 2.0 cm to improve functional mobility. - Goal Met     Discharge reason: Patient has met all of his goals    Plan   This patient is discharged from Physical Therapy.    Date of Last visit: 5/24/2024  Total Visits Received: 24  Cancelled Visits: 0  No Show Visits: 0    OLIVIA H HARMONY, PT, DPT

## 2024-07-02 ENCOUNTER — OFFICE VISIT (OUTPATIENT)
Dept: FAMILY MEDICINE | Facility: CLINIC | Age: 24
End: 2024-07-02
Payer: OTHER GOVERNMENT

## 2024-07-02 VITALS
SYSTOLIC BLOOD PRESSURE: 133 MMHG | WEIGHT: 225.81 LBS | OXYGEN SATURATION: 100 % | BODY MASS INDEX: 32.33 KG/M2 | DIASTOLIC BLOOD PRESSURE: 82 MMHG | HEIGHT: 70 IN | TEMPERATURE: 99 F | RESPIRATION RATE: 20 BRPM | HEART RATE: 59 BPM

## 2024-07-02 DIAGNOSIS — Z79.899 MEDICATION MANAGEMENT: ICD-10-CM

## 2024-07-02 DIAGNOSIS — E66.09 CLASS 1 OBESITY DUE TO EXCESS CALORIES WITHOUT SERIOUS COMORBIDITY WITH BODY MASS INDEX (BMI) OF 32.0 TO 32.9 IN ADULT: Primary | ICD-10-CM

## 2024-07-02 PROBLEM — E66.811 CLASS 1 OBESITY DUE TO EXCESS CALORIES WITHOUT SERIOUS COMORBIDITY WITH BODY MASS INDEX (BMI) OF 32.0 TO 32.9 IN ADULT: Status: ACTIVE | Noted: 2024-07-02

## 2024-07-02 LAB

## 2024-07-02 PROCEDURE — 99214 OFFICE O/P EST MOD 30 MIN: CPT | Mod: ,,, | Performed by: NURSE PRACTITIONER

## 2024-07-02 PROCEDURE — 80305 DRUG TEST PRSMV DIR OPT OBS: CPT | Mod: QW,,, | Performed by: NURSE PRACTITIONER

## 2024-07-02 RX ORDER — CHOLECALCIFEROL (VITAMIN D3) 25 MCG
1000 TABLET ORAL DAILY
COMMUNITY

## 2024-07-02 RX ORDER — PHENTERMINE HYDROCHLORIDE 37.5 MG/1
37.5 TABLET ORAL
Qty: 30 TABLET | Refills: 0 | Status: SHIPPED | OUTPATIENT
Start: 2024-07-02 | End: 2024-08-01

## 2024-07-02 RX ORDER — MULTIVITAMIN
1 TABLET ORAL DAILY
COMMUNITY

## 2024-07-02 NOTE — ASSESSMENT & PLAN NOTE
UDS and  reviewed with no aberrant behavior noted.  Adipex prescribed to take as directed. Instructed to take 1/2 tablet by mouth daily x 7 days then take one tablet by mouth daily thereafter. Pt vu.   Instructed on side effects of medication and instructed to inform provider of any adverse effects.   F/U one month.

## 2024-07-02 NOTE — PROGRESS NOTES
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 Pt states that Donna Leyva use to prescribe it to him.)   Health Maintenance Due   Topic Date Due    Hepatitis C Screening  Never done    Lipid Panel  Never done    HIV Screening  Never done    HPV Vaccines (1 - Male 3-dose series) Never done    TETANUS VACCINE  Never done    COVID-19 Vaccine (3 - 2023-24 season) 2023          Update PCP  Update Chief Complaint         History of Present Illness / Problem Focused Workflow     Antonio Mejia presents to the clinic wanting to get back no medication he was on before. He states he was thinking it was for weight loss instead of ADHD. He states he has taken Adderall before when he was younger, but he is really more interested in losing weight due to he gained weight just since his last visit here and has been dieting and exercising daily. He states he has not had much success at losing weight doing this and would like to try Adipex again. He did take this in  and done well on medication. He denies any chest pain, heart palpitations. He denies any other needs at this time. NAD noted.     No results found for: "HGBA1C"     CMP  Sodium   Date Value Ref Range Status   2024 140 136 - 145 mmol/L Final     Potassium   Date Value Ref Range Status   2024 3.9 3.5 - 5.1 mmol/L Final     Chloride   Date Value Ref Range Status   2024 106 98 - 107 mmol/L Final     CO2   Date Value Ref Range Status " "  04/26/2024 26 21 - 32 mmol/L Final     Glucose   Date Value Ref Range Status   04/26/2024 82 74 - 106 mg/dL Final     BUN   Date Value Ref Range Status   04/26/2024 11 7 - 18 mg/dL Final     Creatinine   Date Value Ref Range Status   04/26/2024 1.15 0.70 - 1.30 mg/dL Final     Calcium   Date Value Ref Range Status   04/26/2024 9.5 8.5 - 10.1 mg/dL Final     Total Protein   Date Value Ref Range Status   04/26/2024 7.8 6.4 - 8.2 g/dL Final     Albumin   Date Value Ref Range Status   04/26/2024 4.3 3.5 - 5.0 g/dL Final     Bilirubin, Total   Date Value Ref Range Status   04/26/2024 0.6 >0.0 - 1.2 mg/dL Final     Alk Phos   Date Value Ref Range Status   04/26/2024 63 45 - 115 U/L Final     AST   Date Value Ref Range Status   04/26/2024 20 15 - 37 U/L Final     ALT   Date Value Ref Range Status   04/26/2024 31 16 - 61 U/L Final     Anion Gap   Date Value Ref Range Status   04/26/2024 12 7 - 16 mmol/L Final     eGFR   Date Value Ref Range Status   04/26/2024 92 >=60 mL/min/1.73m2 Final        Lab Results   Component Value Date    WBC 6.70 04/26/2024    RBC 5.01 04/26/2024    HGB 15.2 04/26/2024    HCT 46.0 04/26/2024    MCV 91.8 04/26/2024    MCH 30.3 04/26/2024    MCHC 33.0 04/26/2024    RDW 12.9 04/26/2024     (H) 04/26/2024    MPV 8.7 (L) 04/26/2024    LYMPH 31.8 04/26/2024    LYMPH 2.13 04/26/2024    MONO 9.1 (H) 04/26/2024    EOS 0.26 04/26/2024    BASO 0.06 04/26/2024    EOSINOPHIL 3.9 04/26/2024    BASOPHIL 0.9 04/26/2024        No results found for: "CHOL"  No results found for: "HDL"  No results found for: "LDLCALC"  No results found for: "TRIG"  No results found for: "CHOLHDL"     Wt Readings from Last 3 Encounters:   07/02/24 0931 102.4 kg (225 lb 12.8 oz)   04/26/24 1010 99.2 kg (218 lb 9.6 oz)   02/19/24 0710 95.3 kg (210 lb)        BP Readings from Last 3 Encounters:   07/02/24 133/82   04/26/24 131/79   02/19/24 (!) 130/52        Review of Systems     Review of Systems   Constitutional: Negative.  "   Eyes: Negative.    Respiratory: Negative.     Cardiovascular: Negative.    Gastrointestinal: Negative.    Endocrine: Negative.    Genitourinary: Negative.    Musculoskeletal: Negative.    Integumentary:  Negative.   Allergic/Immunologic: Negative.    Neurological: Negative.    Psychiatric/Behavioral: Negative.          Medical / Social / Family History     Past Medical History:   Diagnosis Date    ADHD (attention deficit hyperactivity disorder)        Past Surgical History:   Procedure Laterality Date    ARTHROSCOPY, KNEE, WITH ABRASION ARTHROPLASTY OR MICROFRACTURE Right 2/19/2024    Procedure: ARTHROSCOPY, KNEE, WITH ABRASION ARTHROPLASTY OR MICROFRACTURE;  Surgeon: Walter Ventura MD;  Location: [facility];  Service: Orthopedics;  Laterality: Right;    ARTHROSCOPY,KNEE,WITH MENISCUS REPAIR Right 2/19/2024    Procedure: ARTHROSCOPY,KNEE,WITH MENISCUS REPAIR;  Surgeon: Walter Ventura MD;  Location: [facility];  Service: Orthopedics;  Laterality: Right;    HAND SURGERY      TONSILLECTOMY         Social History    reports that he has quit smoking. His smokeless tobacco use includes snuff. He reports that he does not currently use alcohol. He reports that he does not use drugs.    Family History  's family history includes Dementia in his paternal grandmother.    Medications and Allergies     Medications  Outpatient Medications Marked as Taking for the 7/2/24 encounter (Office Visit) with Allyson Jesus FNP   Medication Sig Dispense Refill    multivitamin (THERAGRAN) per tablet Take 1 tablet by mouth once daily.      vitamin D (VITAMIN D3) 1000 units Tab Take 1,000 Units by mouth once daily.         Allergies  Review of patient's allergies indicates:  No Known Allergies    Physical Examination     Vitals:    07/02/24 0931   BP: 133/82   BP Location: Left arm   Patient Position: Sitting   BP Method: Large (Automatic)   Pulse: (!) 59   Resp: 20   Temp: 98.6 °F (37 °C)   TempSrc: Oral   SpO2: 100%  "  Weight: 102.4 kg (225 lb 12.8 oz)   Height: 5' 10" (1.778 m)      Physical Exam  Constitutional:       Appearance: Normal appearance. He is obese.   HENT:      Head: Normocephalic.   Eyes:      Extraocular Movements: Extraocular movements intact.   Cardiovascular:      Rate and Rhythm: Normal rate and regular rhythm.      Pulses: Normal pulses.      Heart sounds: Normal heart sounds.   Pulmonary:      Effort: Pulmonary effort is normal.      Breath sounds: Normal breath sounds.   Skin:     General: Skin is warm and dry.      Capillary Refill: Capillary refill takes less than 2 seconds.   Neurological:      General: No focal deficit present.      Mental Status: He is alert and oriented to person, place, and time.   Psychiatric:         Mood and Affect: Mood normal.         Behavior: Behavior normal.          Assessment and Plan (including Health Maintenance)      Problem List  Smart Sets  Document Outside HM   :    Plan:     There are no Patient Instructions on file for this visit.       Health Maintenance Due   Topic Date Due    Hepatitis C Screening  Never done    Lipid Panel  Never done    HIV Screening  Never done    HPV Vaccines (1 - Male 3-dose series) Never done    TETANUS VACCINE  Never done    COVID-19 Vaccine (3 - 2023-24 season) 09/01/2023       Problem List Items Addressed This Visit       Class 1 obesity due to excess calories without serious comorbidity with body mass index (BMI) of 32.0 to 32.9 in adult - Primary    Current Assessment & Plan     UDS and  reviewed with no aberrant behavior noted.  Adipex prescribed to take as directed. Instructed to take 1/2 tablet by mouth daily x 7 days then take one tablet by mouth daily thereafter. Pt vu.   Instructed on side effects of medication and instructed to inform provider of any adverse effects.   F/U one month.            Relevant Medications    phentermine (ADIPEX-P) 37.5 mg tablet    Medication management    Current Assessment & Plan     See above " plan.          Relevant Orders    POCT Urine Drug Screen Presump (Completed)     Class 1 obesity due to excess calories without serious comorbidity with body mass index (BMI) of 32.0 to 32.9 in adult  -     phentermine (ADIPEX-P) 37.5 mg tablet; Take 1 tablet (37.5 mg total) by mouth before breakfast.  Dispense: 30 tablet; Refill: 0    Medication management  -     POCT Urine Drug Screen Presump       Health Maintenance Topics with due status: Not Due       Topic Last Completion Date    Influenza Vaccine 10/02/2023         Future Appointments   Date Time Provider Department Center   8/16/2024 10:40 AM Allyson Jesus FNP [facility]   10/28/2024  8:00 [facility]        Follow up in about 4 weeks (around 7/30/2024), or if symptoms worsen or fail to improve.    Health Maintenance Due   Topic Date Due    Hepatitis C Screening  Never done    Lipid Panel  Never done    HIV Screening  Never done    HPV Vaccines (1 - Male 3-dose series) Never done    TETANUS VACCINE  Never done    COVID-19 Vaccine (3 - 2023-24 season) 09/01/2023        Signature:  MARLON Maria    Date of encounter: 7/2/24

## 2024-08-01 DIAGNOSIS — E66.09 CLASS 1 OBESITY DUE TO EXCESS CALORIES WITHOUT SERIOUS COMORBIDITY WITH BODY MASS INDEX (BMI) OF 32.0 TO 32.9 IN ADULT: ICD-10-CM

## 2024-08-02 RX ORDER — PHENTERMINE HYDROCHLORIDE 37.5 MG/1
37.5 TABLET ORAL
Qty: 30 TABLET | Refills: 0 | Status: SHIPPED | OUTPATIENT
Start: 2024-08-02 | End: 2024-09-01

## 2024-08-02 NOTE — TELEPHONE ENCOUNTER
Please see the attached refill request. I am getting Su to try and get him in first part of next week for this refill

## 2024-08-16 ENCOUNTER — OFFICE VISIT (OUTPATIENT)
Dept: FAMILY MEDICINE | Facility: CLINIC | Age: 24
End: 2024-08-16
Payer: OTHER GOVERNMENT

## 2024-08-16 VITALS
DIASTOLIC BLOOD PRESSURE: 77 MMHG | SYSTOLIC BLOOD PRESSURE: 120 MMHG | HEIGHT: 70 IN | RESPIRATION RATE: 18 BRPM | BODY MASS INDEX: 30.61 KG/M2 | TEMPERATURE: 98 F | OXYGEN SATURATION: 98 % | WEIGHT: 213.81 LBS | HEART RATE: 79 BPM

## 2024-08-16 DIAGNOSIS — E66.09 CLASS 1 OBESITY DUE TO EXCESS CALORIES WITHOUT SERIOUS COMORBIDITY WITH BODY MASS INDEX (BMI) OF 32.0 TO 32.9 IN ADULT: ICD-10-CM

## 2024-08-16 RX ORDER — PHENTERMINE HYDROCHLORIDE 37.5 MG/1
37.5 TABLET ORAL
Qty: 30 TABLET | Refills: 0 | Status: SHIPPED | OUTPATIENT
Start: 2024-08-16 | End: 2024-09-15

## 2024-08-16 RX ORDER — MULTIVITAMIN
1 TABLET ORAL DAILY
COMMUNITY
End: 2024-08-16

## 2024-08-16 NOTE — PROGRESS NOTES
"   MARLON Maria   [facility]  [address]  [place] [address]     PATIENT NAME: Antonio Mejia  : 2000  DATE: 24  MRN: [id number]      Billing Provider: MARLON Maria  Level of Service:   Patient PCP Information       Provider PCP Type    MARLON Maria General            Reason for Visit / Chief Complaint: Obesity   Health Maintenance Due   Topic Date Due    Hepatitis C Screening  Never done    Lipid Panel  Never done    HIV Screening  Never done    HPV Vaccines (1 - Male 3-dose series) Never done    TETANUS VACCINE  Never done    COVID-19 Vaccine (3 -  season) 2023          Update PCP  Update Chief Complaint         History of Present Illness / Problem Focused Workflow     Antonio Mejia presents to the clinic for med refill on Adipex. Pt states he is doing well and denies any adverse effects of medication. He has lost 12 pounds since last visit. He denies any other needs at this time. NAD noted.     No results found for: "HGBA1C"     CMP  Sodium   Date Value Ref Range Status   2024 140 136 - 145 mmol/L Final     Potassium   Date Value Ref Range Status   2024 3.9 3.5 - 5.1 mmol/L Final     Chloride   Date Value Ref Range Status   2024 106 98 - 107 mmol/L Final     CO2   Date Value Ref Range Status   2024 26 21 - 32 mmol/L Final     Glucose   Date Value Ref Range Status   2024 82 74 - 106 mg/dL Final     BUN   Date Value Ref Range Status   2024 11 7 - 18 mg/dL Final     Creatinine   Date Value Ref Range Status   2024 1.15 0.70 - 1.30 mg/dL Final     Calcium   Date Value Ref Range Status   2024 9.5 8.5 - 10.1 mg/dL Final     Total Protein   Date Value Ref Range Status   2024 7.8 6.4 - 8.2 g/dL Final     Albumin   Date Value Ref Range Status   2024 4.3 3.5 - 5.0 g/dL Final     Bilirubin, Total   Date Value Ref Range Status   2024 0.6 >0.0 - 1.2 mg/dL Final     Alk Phos   Date Value Ref Range " "Status   04/26/2024 63 45 - 115 U/L Final     AST   Date Value Ref Range Status   04/26/2024 20 15 - 37 U/L Final     ALT   Date Value Ref Range Status   04/26/2024 31 16 - 61 U/L Final     Anion Gap   Date Value Ref Range Status   04/26/2024 12 7 - 16 mmol/L Final     eGFR   Date Value Ref Range Status   04/26/2024 92 >=60 mL/min/1.73m2 Final        Lab Results   Component Value Date    WBC 6.70 04/26/2024    RBC 5.01 04/26/2024    HGB 15.2 04/26/2024    HCT 46.0 04/26/2024    MCV 91.8 04/26/2024    MCH 30.3 04/26/2024    MCHC 33.0 04/26/2024    RDW 12.9 04/26/2024     (H) 04/26/2024    MPV 8.7 (L) 04/26/2024    LYMPH 31.8 04/26/2024    LYMPH 2.13 04/26/2024    MONO 9.1 (H) 04/26/2024    EOS 0.26 04/26/2024    BASO 0.06 04/26/2024    EOSINOPHIL 3.9 04/26/2024    BASOPHIL 0.9 04/26/2024        No results found for: "CHOL"  No results found for: "HDL"  No results found for: "LDLCALC"  No results found for: "TRIG"  No results found for: "CHOLHDL"     Wt Readings from Last 3 Encounters:   08/16/24 1059 97 kg (213 lb 12.8 oz)   07/02/24 0931 102.4 kg (225 lb 12.8 oz)   04/26/24 1010 99.2 kg (218 lb 9.6 oz)        BP Readings from Last 3 Encounters:   08/16/24 120/77   07/02/24 133/82   04/26/24 131/79        Review of Systems     Review of Systems   Constitutional: Negative.    Eyes: Negative.    Respiratory: Negative.     Cardiovascular: Negative.    Gastrointestinal: Negative.    Endocrine: Negative.    Genitourinary: Negative.    Musculoskeletal: Negative.    Integumentary:  Negative.   Allergic/Immunologic: Negative.    Neurological: Negative.    Hematological: Negative.    Psychiatric/Behavioral: Negative.          Medical / Social / Family History     Past Medical History:   Diagnosis Date    ADHD (attention deficit hyperactivity disorder)        Past Surgical History:   Procedure Laterality Date    ARTHROSCOPY, KNEE, WITH ABRASION ARTHROPLASTY OR MICROFRACTURE Right 2/19/2024    Procedure: ARTHROSCOPY, " "KNEE, WITH ABRASION ARTHROPLASTY OR MICROFRACTURE;  Surgeon: Walter Ventura MD;  Location: [facility];  Service: Orthopedics;  Laterality: Right;    ARTHROSCOPY,KNEE,WITH MENISCUS REPAIR Right 2/19/2024    Procedure: ARTHROSCOPY,KNEE,WITH MENISCUS REPAIR;  Surgeon: Walter Ventura MD;  Location: [facility] OR;  Service: Orthopedics;  Laterality: Right;    HAND SURGERY      TONSILLECTOMY         Social History    reports that he has quit smoking. His smokeless tobacco use includes snuff. He reports that he does not currently use alcohol. He reports that he does not use drugs.    Family History  's family history includes Dementia in his paternal grandmother.    Medications and Allergies     Medications  Outpatient Medications Marked as Taking for the 8/16/24 encounter (Office Visit) with Allyson Jesus FNP   Medication Sig Dispense Refill    multivitamin (THERAGRAN) per tablet Take 1 tablet by mouth once daily.      [DISCONTINUED] phentermine (ADIPEX-P) 37.5 mg tablet Take 1 tablet (37.5 mg total) by mouth before breakfast. 30 tablet 0       Allergies  Review of patient's allergies indicates:  No Known Allergies    Physical Examination     Vitals:    08/16/24 1059   BP: 120/77   BP Location: Left arm   Patient Position: Sitting   BP Method: Large (Automatic)   Pulse: 79   Resp: 18   Temp: 97.7 °F (36.5 °C)   TempSrc: Oral   SpO2: 98%   Weight: 97 kg (213 lb 12.8 oz)   Height: 5' 10" (1.778 m)      Physical Exam  Constitutional:       Appearance: Normal appearance. He is obese.   HENT:      Head: Normocephalic.   Eyes:      Extraocular Movements: Extraocular movements intact.   Cardiovascular:      Rate and Rhythm: Normal rate and regular rhythm.      Pulses: Normal pulses.      Heart sounds: Normal heart sounds.   Pulmonary:      Effort: Pulmonary effort is normal.      Breath sounds: Normal breath sounds.   Skin:     General: Skin is warm and dry.      Capillary Refill: Capillary refill takes less " than 2 seconds.   Neurological:      General: No focal deficit present.      Mental Status: He is alert and oriented to person, place, and time.   Psychiatric:         Mood and Affect: Mood normal.         Behavior: Behavior normal.          Assessment and Plan (including Health Maintenance)      Problem List  Smart Sets  Document Outside HM   :    Plan:     There are no Patient Instructions on file for this visit.       Health Maintenance Due   Topic Date Due    Hepatitis C Screening  Never done    Lipid Panel  Never done    HIV Screening  Never done    HPV Vaccines (1 - Male 3-dose series) Never done    TETANUS VACCINE  Never done    COVID-19 Vaccine (3 - 2023-24 season) 09/01/2023       Problem List Items Addressed This Visit       Class 1 obesity due to excess calories without serious comorbidity with body mass index (BMI) of 32.0 to 32.9 in adult    Current Assessment & Plan     UDS UTD and  reviewed with no aberrant behavior noted.  Pt stable on current medication. Adipex refilled.   Instructed this will be last month of adipex rx. He can set up appt with weight management if he chooses to do so. Pt vu.          Relevant Medications    phentermine (ADIPEX-P) 37.5 mg tablet     Class 1 obesity due to excess calories without serious comorbidity with body mass index (BMI) of 32.0 to 32.9 in adult  -     phentermine (ADIPEX-P) 37.5 mg tablet; Take 1 tablet (37.5 mg total) by mouth before breakfast.  Dispense: 30 tablet; Refill: 0       Health Maintenance Topics with due status: Not Due       Topic Last Completion Date    Influenza Vaccine 10/02/2023         Future Appointments   Date Time Provider Department Center   10/28/2024  8:00 AM Allyson Jesus FNP [facility]        Follow up if symptoms worsen or fail to improve.    Health Maintenance Due   Topic Date Due    Hepatitis C Screening  Never done    Lipid Panel  Never done    HIV Screening  Never done    HPV Vaccines (1 - Male 3-dose series)  Never done    TETANUS VACCINE  Never done    COVID-19 Vaccine (3 - 2023-24 season) 09/01/2023        Signature:  MARLON Maria    Date of encounter: 8/16/24

## 2024-08-16 NOTE — ASSESSMENT & PLAN NOTE
UDS UTD and  reviewed with no aberrant behavior noted.  Pt stable on current medication. Adipex refilled.   Instructed this will be last month of adipex rx. He can set up appt with weight management if he chooses to do so. Pt vu.

## 2024-09-16 ENCOUNTER — TELEPHONE (OUTPATIENT)
Dept: FAMILY MEDICINE | Facility: CLINIC | Age: 24
End: 2024-09-16
Payer: OTHER GOVERNMENT

## 2024-09-16 NOTE — TELEPHONE ENCOUNTER
----- Message from Porsha Lee sent at 9/13/2024 12:37 PM CDT -----  Patient requests call back   Phone [phone]

## 2024-10-03 ENCOUNTER — OFFICE VISIT (OUTPATIENT)
Dept: FAMILY MEDICINE | Facility: CLINIC | Age: 24
End: 2024-10-03
Payer: OTHER GOVERNMENT

## 2024-10-03 VITALS
TEMPERATURE: 99 F | HEART RATE: 116 BPM | WEIGHT: 213.81 LBS | RESPIRATION RATE: 20 BRPM | HEIGHT: 70 IN | SYSTOLIC BLOOD PRESSURE: 135 MMHG | OXYGEN SATURATION: 100 % | BODY MASS INDEX: 30.61 KG/M2 | DIASTOLIC BLOOD PRESSURE: 76 MMHG

## 2024-10-03 DIAGNOSIS — R05.9 COUGH, UNSPECIFIED TYPE: ICD-10-CM

## 2024-10-03 DIAGNOSIS — R00.0 SINUS TACHYCARDIA: ICD-10-CM

## 2024-10-03 DIAGNOSIS — J32.9 SINUSITIS, UNSPECIFIED CHRONICITY, UNSPECIFIED LOCATION: Primary | ICD-10-CM

## 2024-10-03 LAB
CTP QC/QA: YES
EKG 12-LEAD: NORMAL
MOLECULAR STREP A: NEGATIVE
POC MOLECULAR INFLUENZA A AGN: NEGATIVE
POC MOLECULAR INFLUENZA B AGN: NEGATIVE
PR INTERVAL: 151
PRT AXES: NORMAL
QRS DURATION: 75
QT/QTC: NORMAL
SARS-COV-2 RDRP RESP QL NAA+PROBE: NEGATIVE
VENTRICULAR RATE: 116

## 2024-10-03 RX ORDER — PHENTERMINE HYDROCHLORIDE 37.5 MG/1
37.5 TABLET ORAL EVERY MORNING
COMMUNITY
Start: 2024-09-05

## 2024-10-03 RX ORDER — CEFTRIAXONE 1 G/1
1 INJECTION, POWDER, FOR SOLUTION INTRAMUSCULAR; INTRAVENOUS
Status: COMPLETED | OUTPATIENT
Start: 2024-10-03 | End: 2024-10-03

## 2024-10-03 RX ORDER — METHYLPREDNISOLONE ACETATE 40 MG/ML
40 INJECTION, SUSPENSION INTRA-ARTICULAR; INTRALESIONAL; INTRAMUSCULAR; SOFT TISSUE
Status: COMPLETED | OUTPATIENT
Start: 2024-10-03 | End: 2024-10-03

## 2024-10-03 RX ORDER — CETIRIZINE HYDROCHLORIDE 10 MG/1
10 TABLET ORAL DAILY
Qty: 30 TABLET | Refills: 1 | Status: SHIPPED | OUTPATIENT
Start: 2024-10-03 | End: 2025-10-03

## 2024-10-03 RX ORDER — FLUTICASONE PROPIONATE 50 MCG
1 SPRAY, SUSPENSION (ML) NASAL DAILY
Qty: 11.1 ML | Refills: 0 | Status: SHIPPED | OUTPATIENT
Start: 2024-10-03

## 2024-10-03 RX ORDER — AZITHROMYCIN 250 MG/1
TABLET, FILM COATED ORAL
Qty: 6 TABLET | Refills: 0 | Status: SHIPPED | OUTPATIENT
Start: 2024-10-03

## 2024-10-03 RX ADMIN — METHYLPREDNISOLONE ACETATE 40 MG: 40 INJECTION, SUSPENSION INTRA-ARTICULAR; INTRALESIONAL; INTRAMUSCULAR; SOFT TISSUE at 02:10

## 2024-10-03 RX ADMIN — CEFTRIAXONE 1 G: 1 INJECTION, POWDER, FOR SOLUTION INTRAMUSCULAR; INTRAVENOUS at 02:10

## 2024-10-03 NOTE — ASSESSMENT & PLAN NOTE
-Rocephin 1GM IM  Methyiprednisolone 40 mg.   -Z-pack complete until the antibiotics are all gone, even if you start to feel better.   -Flonase Daily  -Zertec Daily  -Use Blanche-pot at home.  -Increase fluid intake.   -Return to clinic if s/s persist or do not improve.   -Go to local ER if shortness of breath or chest pain occur.     I explained risk factors of steroid use with the patient which include cardiovascular effects such as Hypertension, dyslipidemia, fluid retention, decreased potassium, sodium retention, a fib. Also discussed other risk such as elevated blood glucose levels, depression, anxiety or agitation, cushing syndrome, gi side effects such as peptic ulcer with possible hemorrhage, abdominal distention. Pt voiced understanding.

## 2024-10-03 NOTE — ASSESSMENT & PLAN NOTE
Her rate was elevated at 136  Recheck 122  EKG sinus tach at 116  Regular rhythm and rate  Temp of 99.9 when rechecked   Tylenol 1000 mg administered.

## 2024-10-03 NOTE — PROGRESS NOTES
HPI:   Antonio Mejia is a pleasant 24 y.o. patient who reports to clinic with complaints of generalized body aches and chills, HA, runny nose, sore throat, cough milky brown mucus been going on for 5 days. He states he also has some sinus pressure in his head around his eyes and forehead. He is not sure if he had fever. He had robitussin two nights ago, he had tylenol sinuses cold and flu last night. He states he took day-quil today at 740 this morning. He states that he has bad pressure around his eyes and nose area. He denies any shortness of breath or chest pain.                     Past Medical History:   Diagnosis Date    ADHD (attention deficit hyperactivity disorder)        PAST SURGICAL HISTORY:   Past Surgical History:   Procedure Laterality Date    ARTHROSCOPY, KNEE, WITH ABRASION ARTHROPLASTY OR MICROFRACTURE Right 2/19/2024    Procedure: ARTHROSCOPY, KNEE, WITH ABRASION ARTHROPLASTY OR MICROFRACTURE;  Surgeon: Walter Ventura MD;  Location: [facility];  Service: Orthopedics;  Laterality: Right;    ARTHROSCOPY,KNEE,WITH MENISCUS REPAIR Right 2/19/2024    Procedure: ARTHROSCOPY,KNEE,WITH MENISCUS REPAIR;  Surgeon: Walter Ventura MD;  Location: [facility];  Service: Orthopedics;  Laterality: Right;    HAND SURGERY      TONSILLECTOMY         MEDICATIONS:    Current Outpatient Medications:     phentermine (ADIPEX-P) 37.5 mg tablet, Take 37.5 mg by mouth every morning., Disp: , Rfl:     azithromycin (Z-PETRA) 250 MG tablet, Take 2 tablets by mouth on day 1; Take 1 tablet by mouth on days 2-5, Disp: 6 tablet, Rfl: 0    cetirizine (ZYRTEC) 10 MG tablet, Take 1 tablet (10 mg total) by mouth once daily., Disp: 30 tablet, Rfl: 1    fluticasone propionate (FLONASE) 50 mcg/actuation nasal spray, 1 spray (50 mcg total) by Each Nostril route once daily., Disp: 11.1 mL, Rfl: 0    multivitamin (THERAGRAN) per tablet, Take 1 tablet by mouth once daily. (Patient not taking: Reported on 10/3/2024), Disp: ,  Rfl:   No current facility-administered medications for this visit.    ALLERGIES:   Review of patient's allergies indicates:  No Known Allergies      Review of Systems   Constitutional: Negative.  Negative for activity change, chills and fever.   HENT:  Positive for postnasal drip, rhinorrhea, sinus pressure/congestion and sore throat. Negative for drooling and nosebleeds.    Eyes: Negative.    Respiratory:  Positive for cough. Negative for chest tightness.    Cardiovascular: Negative.  Negative for chest pain and palpitations.   Gastrointestinal: Negative.    Endocrine: Negative.    Genitourinary: Negative.  Negative for difficulty urinating.   Musculoskeletal: Negative.    Integumentary:  Negative.   Allergic/Immunologic: Negative.    Neurological:  Positive for headaches. Negative for dizziness.   Hematological: Negative.    Psychiatric/Behavioral: Negative.     All other systems reviewed and are negative.         Physical Exam  Constitutional:       General: He is not in acute distress.     Appearance: Normal appearance. He is well-developed. He is not ill-appearing.   HENT:      Head: Normocephalic and atraumatic.      Right Ear: Tympanic membrane is erythematous.      Left Ear: Tympanic membrane is erythematous.      Nose: Nasal tenderness and congestion present.      Right Sinus: Maxillary sinus tenderness and frontal sinus tenderness present.      Left Sinus: Maxillary sinus tenderness and frontal sinus tenderness present.      Mouth/Throat:      Mouth: Mucous membranes are moist.      Pharynx: Oropharynx is clear. No posterior oropharyngeal erythema.   Cardiovascular:      Rate and Rhythm: Normal rate and regular rhythm.      Pulses: Normal pulses.      Heart sounds: Normal heart sounds.   Pulmonary:      Effort: Pulmonary effort is normal. No accessory muscle usage or respiratory distress.      Breath sounds: Normal breath sounds.   Abdominal:      General: Abdomen is flat. Bowel sounds are normal. There  "is no distension.      Palpations: Abdomen is soft.      Tenderness: There is no abdominal tenderness.   Musculoskeletal:         General: Normal range of motion.      Cervical back: Normal range of motion.   Skin:     General: Skin is warm and dry.      Capillary Refill: Capillary refill takes less than 2 seconds.   Neurological:      Mental Status: He is alert and oriented to person, place, and time. Mental status is at baseline.   Psychiatric:         Mood and Affect: Mood normal.         Speech: Speech normal.         Behavior: Behavior normal. Behavior is cooperative.         Thought Content: Thought content normal.          VITAL SIGNS:   /76 (BP Location: Right arm)   Pulse (!) 116   Temp 98.6 °F (37 °C) (Oral)   Resp 20   Ht 5' 10" (1.778 m)   Wt 97 kg (213 lb 12.8 oz)   SpO2 100%   BMI 30.68 kg/m²       ASSESSMENT/PLAN  1. Sinusitis, unspecified chronicity, unspecified location  Assessment & Plan:  -Rocephin 1GM IM  Methyiprednisolone 40 mg.   -Z-pack complete until the antibiotics are all gone, even if you start to feel better.   -Flonase Daily  -Zertec Daily  -Use Blanche-pot at home.  -Increase fluid intake.   -Return to clinic if s/s persist or do not improve.   -Go to local ER if shortness of breath or chest pain occur.     I explained risk factors of steroid use with the patient which include cardiovascular effects such as Hypertension, dyslipidemia, fluid retention, decreased potassium, sodium retention, a fib. Also discussed other risk such as elevated blood glucose levels, depression, anxiety or agitation, cushing syndrome, gi side effects such as peptic ulcer with possible hemorrhage, abdominal distention. Pt voiced understanding.       Orders:  -     CBC Auto Differential; Future; Expected date: 10/03/2024  -     cefTRIAXone injection 1 g  -     methylPREDNISolone acetate injection 40 mg  -     azithromycin (Z-PETRA) 250 MG tablet; Take 2 tablets by mouth on day 1; Take 1 tablet by mouth " on days 2-5  Dispense: 6 tablet; Refill: 0  -     fluticasone propionate (FLONASE) 50 mcg/actuation nasal spray; 1 spray (50 mcg total) by Each Nostril route once daily.  Dispense: 11.1 mL; Refill: 0  -     cetirizine (ZYRTEC) 10 MG tablet; Take 1 tablet (10 mg total) by mouth once daily.  Dispense: 30 tablet; Refill: 1    2. Cough, unspecified type  Assessment & Plan:  COVID FLU and Strep negative  Increase fluid intake  Follow up with the clinic if s/s persist or become worse.       Orders:  -     POCT COVID-19 Rapid Screening  -     POCT Strep A, Molecular  -     POCT Influenza A/B Molecular    3. Sinus tachycardia  Assessment & Plan:  Her rate was elevated at 136  Recheck 122  EKG sinus tach at 116  Regular rhythm and rate  Temp of 99.9 when rechecked   Tylenol 1000 mg administered.                  There are no Patient Instructions on file for this visit.  Orders Placed This Encounter   Procedures    CBC Auto Differential     Standing Status:   Future     Standing Expiration Date:   1/1/2026     Order Specific Question:   Send normal result to authorizing provider's In Basket if patient is active on MyChart:     Answer:   Yes    POCT COVID-19 Rapid Screening    POCT Strep A, Molecular    POCT Influenza A/B Molecular

## 2024-10-03 NOTE — ASSESSMENT & PLAN NOTE
COVID FLU and Strep negative  Increase fluid intake  Follow up with the clinic if s/s persist or become worse.

## 2024-10-25 ENCOUNTER — OFFICE VISIT (OUTPATIENT)
Dept: FAMILY MEDICINE | Facility: CLINIC | Age: 24
End: 2024-10-25
Payer: OTHER GOVERNMENT

## 2024-10-25 VITALS
DIASTOLIC BLOOD PRESSURE: 82 MMHG | OXYGEN SATURATION: 96 % | HEIGHT: 70 IN | RESPIRATION RATE: 20 BRPM | WEIGHT: 216.63 LBS | BODY MASS INDEX: 31.01 KG/M2 | SYSTOLIC BLOOD PRESSURE: 138 MMHG | HEART RATE: 90 BPM | TEMPERATURE: 96 F

## 2024-10-25 DIAGNOSIS — R41.840 CONCENTRATION DEFICIT: Primary | ICD-10-CM

## 2024-10-25 PROBLEM — R05.9 COUGH: Status: RESOLVED | Noted: 2024-10-03 | Resolved: 2024-10-25

## 2024-10-25 PROBLEM — J32.9 SINUSITIS: Status: RESOLVED | Noted: 2024-10-03 | Resolved: 2024-10-25

## 2024-10-25 PROBLEM — S83.211A BUCKET HANDLE TEAR OF MEDIAL MENISCUS OF RIGHT KNEE: Status: RESOLVED | Noted: 2024-02-19 | Resolved: 2024-10-25

## 2024-10-25 PROBLEM — R00.0 SINUS TACHYCARDIA: Status: RESOLVED | Noted: 2024-10-03 | Resolved: 2024-10-25

## 2024-10-25 NOTE — ASSESSMENT & PLAN NOTE
Instructed pt on process of getting starting on medication for ADD and will have to start by having a new evaluation for ADD as I do not see any documentation of an evaluation or medication taken for this within the past few years. Provider gave pt some clinics that offer testing and he states he will call them to see about getting this done then come back for medication if warranted.
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
## 2024-11-13 ENCOUNTER — PATIENT MESSAGE (OUTPATIENT)
Dept: RESEARCH | Facility: HOSPITAL | Age: 24
End: 2024-11-13

## 2025-01-14 DIAGNOSIS — R41.840 CONCENTRATION DEFICIT: Primary | ICD-10-CM

## 2025-03-03 ENCOUNTER — OFFICE VISIT (OUTPATIENT)
Dept: FAMILY MEDICINE | Facility: CLINIC | Age: 25
End: 2025-03-03
Payer: OTHER GOVERNMENT

## 2025-03-03 VITALS
WEIGHT: 216.38 LBS | RESPIRATION RATE: 20 BRPM | HEIGHT: 70 IN | TEMPERATURE: 98 F | HEART RATE: 79 BPM | SYSTOLIC BLOOD PRESSURE: 132 MMHG | OXYGEN SATURATION: 100 % | BODY MASS INDEX: 30.98 KG/M2 | DIASTOLIC BLOOD PRESSURE: 80 MMHG

## 2025-03-03 DIAGNOSIS — Z02.89 ENCOUNTER FOR PHYSICAL EXAMINATION RELATED TO EMPLOYMENT: Primary | ICD-10-CM

## 2025-03-03 LAB
BILIRUB SERPL-MCNC: NEGATIVE MG/DL
BLOOD URINE, POC: NEGATIVE
CLARITY, UA: CLEAR
COLOR, UA: YELLOW
EKG 12-LEAD: 67
GLUCOSE UR QL STRIP: NEGATIVE
KETONES UR QL STRIP: NEGATIVE
LEUKOCYTE ESTERASE URINE, POC: NEGATIVE
NITRITE, POC UA: NEGATIVE
PH, POC UA: 6.5
PR INTERVAL: 152
PROTEIN, POC: ABNORMAL
PRT AXES: NORMAL
QRS DURATION: 79
QT/QTC: NORMAL
SPECIFIC GRAVITY, POC UA: 1.02
UROBILINOGEN, POC UA: ABNORMAL
VENTRICULAR RATE: 114

## 2025-03-03 PROCEDURE — 93005 ELECTROCARDIOGRAM TRACING: CPT | Mod: ,,, | Performed by: NURSE PRACTITIONER

## 2025-03-03 PROCEDURE — 99499 UNLISTED E&M SERVICE: CPT | Mod: ,,, | Performed by: NURSE PRACTITIONER

## 2025-03-03 RX ORDER — DEXTROAMPHETAMINE SACCHARATE, AMPHETAMINE ASPARTATE MONOHYDRATE, DEXTROAMPHETAMINE SULFATE AND AMPHETAMINE SULFATE 5; 5; 5; 5 MG/1; MG/1; MG/1; MG/1
CAPSULE, EXTENDED RELEASE ORAL EVERY MORNING
COMMUNITY
Start: 2025-02-28

## 2025-03-03 RX ORDER — DEXTROAMPHETAMINE SACCHARATE, AMPHETAMINE ASPARTATE, DEXTROAMPHETAMINE SULFATE AND AMPHETAMINE SULFATE 2.5; 2.5; 2.5; 2.5 MG/1; MG/1; MG/1; MG/1
1 TABLET ORAL
COMMUNITY
Start: 2025-02-28

## 2025-03-04 NOTE — PROGRESS NOTES
"   MARLON Maria   [facility]  [address]  [place] [address]     PATIENT NAME: Antonio Mejia  : 2000  DATE: 3/3/25  MRN: [id number]      Billing Provider: MARLON Maria  Level of Service:   Patient PCP Information       Provider PCP Type    MARLON Maria General            Reason for Visit / Chief Complaint: Employment Physical (Pt presents to clinic for a work physical )   Health Maintenance Due   Topic Date Due    Hepatitis C Screening  Never done    Lipid Panel  Never done    HIV Screening  Never done    HPV Vaccines (1 - Male 3-dose series) Never done    TETANUS VACCINE  Never done    COVID-19 Vaccine (3 - 2024-25 season) 2024          Update PCP  Update Chief Complaint         History of Present Illness / Problem Focused Workflow     History of Present Illness    CHIEF COMPLAINT:  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 He is followed by [facility] for this.          No results found for: "HGBA1C"     CMP  Sodium   Date Value Ref Range Status   2024 140 136 - 145 mmol/L Final     Potassium   Date Value Ref Range Status   2024 3.9 3.5 - 5.1 mmol/L Final     Chloride   Date Value Ref Range Status   2024 106 98 - 107 mmol/L Final     CO2   Date Value Ref Range Status   2024 26 21 - 32 mmol/L Final     Glucose   Date Value Ref Range Status   2024 82 74 - 106 mg/dL Final     BUN   Date Value Ref Range Status   2024 11 7 - 18 mg/dL Final     Creatinine   Date Value Ref Range Status   2024 1.15 0.70 - 1.30 mg/dL Final     Calcium   Date Value Ref Range Status   2024 9.5 8.5 - 10.1 mg/dL Final     Total Protein   Date Value Ref Range Status   2024 7.8 6.4 - 8.2 g/dL Final     Albumin   Date Value Ref Range Status   2024 4.3 3.5 - 5.0 g/dL Final     Bilirubin, Total   Date Value Ref Range Status   2024 " "0.6 >0.0 - 1.2 mg/dL Final     Alk Phos   Date Value Ref Range Status   04/26/2024 63 45 - 115 U/L Final     AST   Date Value Ref Range Status   04/26/2024 20 15 - 37 U/L Final     ALT   Date Value Ref Range Status   04/26/2024 31 16 - 61 U/L Final     Anion Gap   Date Value Ref Range Status   04/26/2024 12 7 - 16 mmol/L Final     eGFR   Date Value Ref Range Status   04/26/2024 92 >=60 mL/min/1.73m2 Final        Lab Results   Component Value Date    WBC 6.70 04/26/2024    RBC 5.01 04/26/2024    HGB 15.2 04/26/2024    HCT 46.0 04/26/2024    MCV 91.8 04/26/2024    MCH 30.3 04/26/2024    MCHC 33.0 04/26/2024    RDW 12.9 04/26/2024     (H) 04/26/2024    MPV 8.7 (L) 04/26/2024    LYMPH 31.8 04/26/2024    LYMPH 2.13 04/26/2024    MONO 9.1 (H) 04/26/2024    EOS 0.26 04/26/2024    BASO 0.06 04/26/2024    EOSINOPHIL 3.9 04/26/2024    BASOPHIL 0.9 04/26/2024        No results found for: "CHOL"  No results found for: "HDL"  No results found for: "LDLCALC"  No results found for: "TRIG"  No results found for: "CHOLHDL"     Wt Readings from Last 3 Encounters:   03/03/25 1344 98.2 kg (216 lb 6.4 oz)   10/25/24 1538 98.2 kg (216 lb 9.6 oz)   10/03/24 1339 97 kg (213 lb 12.8 oz)        BP Readings from Last 3 Encounters:   03/03/25 132/80   10/25/24 138/82   10/03/24 135/76        Review of Systems     Review of Systems       Medical / Social / Family History     Past Medical History:   Diagnosis Date    ADHD (attention deficit hyperactivity disorder)     Bucket handle tear of medial meniscus of right knee 02/19/2024    Sinusitis 10/03/2024       Past Surgical History:   Procedure Laterality Date    ARTHROSCOPY, KNEE, WITH ABRASION ARTHROPLASTY OR MICROFRACTURE Right 2/19/2024    Procedure: ARTHROSCOPY, KNEE, WITH ABRASION ARTHROPLASTY OR MICROFRACTURE;  Surgeon: Walter Ventura MD;  Location: [facility];  Service: Orthopedics;  Laterality: Right;    ARTHROSCOPY,KNEE,WITH MENISCUS REPAIR Right 2/19/2024    Procedure: " "ARTHROSCOPY,KNEE,WITH MENISCUS REPAIR;  Surgeon: Walter Ventura MD;  Location: [facility];  Service: Orthopedics;  Laterality: Right;    HAND SURGERY      TONSILLECTOMY         Social History    reports that he has quit smoking. He quit smokeless tobacco use about 5 months ago.  His smokeless tobacco use included snuff. He reports that he does not currently use alcohol. He reports that he does not use drugs.    Family History  's family history includes Dementia in his paternal grandmother.    Medications and Allergies     Medications  Outpatient Medications Marked as Taking for the 3/3/25 encounter (Office Visit) with Allyson Jesus FNP   Medication Sig Dispense Refill    dextroamphetamine-amphetamine (ADDERALL XR) 20 MG 24 hr capsule Take by mouth every morning.      dextroamphetamine-amphetamine 10 mg Tab Take 1 tablet by mouth.         Allergies  Review of patient's allergies indicates:  No Known Allergies    Physical Examination     Vitals:    03/03/25 1344 03/03/25 1425   BP: (!) 147/90 132/80   BP Location: Left arm Left arm   Patient Position: Sitting Sitting   Pulse: 79    Resp: 20    Temp: 98 °F (36.7 °C)    TempSrc: Oral    SpO2: 100%    Weight: 98.2 kg (216 lb 6.4 oz)    Height: 5' 10" (1.778 m)       Physical Exam  Constitutional:       Appearance: Normal appearance. He is obese.   HENT:      Head: Normocephalic.      Nose: Nose normal.   Eyes:      Extraocular Movements: Extraocular movements intact.      Pupils: Pupils are equal, round, and reactive to light.   Cardiovascular:      Rate and Rhythm: Normal rate and regular rhythm.      Pulses: Normal pulses.      Heart sounds: Normal heart sounds.   Pulmonary:      Effort: Pulmonary effort is normal.      Breath sounds: Normal breath sounds.   Abdominal:      General: Abdomen is flat. Bowel sounds are normal.      Palpations: Abdomen is soft.   Musculoskeletal:         General: Normal range of motion.      Cervical back: Normal range of " motion.   Skin:     General: Skin is warm and dry.      Capillary Refill: Capillary refill takes less than 2 seconds.   Neurological:      General: No focal deficit present.      Mental Status: He is alert and oriented to person, place, and time.   Psychiatric:         Mood and Affect: Mood normal.         Behavior: Behavior normal.          Assessment and Plan (including Health Maintenance)      Problem List  Smart Sets  Document Outside HM   :    Plan:     There are no Patient Instructions on file for this visit.       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  - Patient wears contact lenses.       Encounter for physical examination related to employment  -     POCT URINALYSIS W/O SCOPE  -     POCT EKG 12-LEAD (Manually Resulted by Ordering Provider)       Health Maintenance Topics with due status: Not Due       Topic Last Completion Date    RSV Vaccine (Age 60+ and Pregnant patients) Not Due         No future appointments.     No follow-ups on file.    Health Maintenance Due   Topic Date Due    Hepatitis C Screening  Never done    Lipid Panel  Never done    HIV Screening  Never done    HPV Vaccines (1 - Male  3-dose series) Never done    TETANUS VACCINE  Never done    COVID-19 Vaccine (3 - 2024-25 season) 09/01/2024        Signature:  MARLON Maria    Date of encounter: 3/3/25  This note was generated with the assistance of ambient listening technology. Verbal consent was obtained by the patient and accompanying visitor(s) for the recording of patient appointment to facilitate this note. I attest to having reviewed and edited the generated note for accuracy, though some syntax or spelling errors may persist. Please contact the author of this note for any clarification.

## (undated) DEVICE — SOL NACL IRR 3000ML

## (undated) DEVICE — BANDAGE ACE DOUBLE STER 6IN

## (undated) DEVICE — GLOVE 7.0 PROTEXIS PI BLUE

## (undated) DEVICE — DRAPE ARTHSCP T ORTHOMAX POUCH

## (undated) DEVICE — COVER PROXIMA MAYO STAND

## (undated) DEVICE — SUT FIBERWIRE 18IN BLUE 3-0

## (undated) DEVICE — DRESSING XEROFORM NONADH 1X8IN

## (undated) DEVICE — SUT ETHILON 3-0 PS2 18 BLK

## (undated) DEVICE — GLOVE BIOGEL SKINSENSE PI 6.5

## (undated) DEVICE — CANISTER SUCTION MEDI-VAC 12L

## (undated) DEVICE — GLOVE BIOGEL SKINSENSE PI 7.5

## (undated) DEVICE — DRAPE INVISISHIELD U 48X52IN

## (undated) DEVICE — TUBING CROSSFLOW INFLOW CASS

## (undated) DEVICE — PAD ABDOMINAL 8X7.5 STERILE

## (undated) DEVICE — DEVICE SUCTION H20 BROOM 12FT

## (undated) DEVICE — KIT SERIES 1 ACP

## (undated) DEVICE — SHAVER SYNNOVATOR PLAT SERIES 4.5MM

## (undated) DEVICE — PUSHER CUTTER KNOT FAST FX STR

## (undated) DEVICE — NDL HYPODERMIC SAFETY 25G 1IN

## (undated) DEVICE — SUT PDS II O CT-2 VIL MONO

## (undated) DEVICE — TUBE SUCTION MEDI-VAC STERILE

## (undated) DEVICE — APPLICATOR CHLORAPREP ORN 26ML

## (undated) DEVICE — TOURNIQUET SB QC SP 34X4IN

## (undated) DEVICE — NDL SPINAL 18GX3.5 SPINOCAN

## (undated) DEVICE — CANNULA CLEAR TRAC 8X76X5MM

## (undated) DEVICE — Device

## (undated) DEVICE — SOLIDIFIER BTL W/TREAT 1500CC

## (undated) DEVICE — PACK KNEE ARTHROSCOPY  RUSH

## (undated) DEVICE — GOWN IMPERVIOUS BLUE XXL

## (undated) DEVICE — SPONGE COTTON TRAY 4X4IN

## (undated) DEVICE — GLOVE 7.5 PROTEXIS PI BLUE

## (undated) DEVICE — GLOVE BIOGEL SKINSENSE PI 7.0

## (undated) DEVICE — SYR 10CC LUER LOCK